# Patient Record
Sex: FEMALE | Race: BLACK OR AFRICAN AMERICAN | HISPANIC OR LATINO | Employment: OTHER | URBAN - METROPOLITAN AREA
[De-identification: names, ages, dates, MRNs, and addresses within clinical notes are randomized per-mention and may not be internally consistent; named-entity substitution may affect disease eponyms.]

---

## 2019-08-15 ENCOUNTER — HOSPITAL ENCOUNTER (EMERGENCY)
Age: 34
Discharge: HOME | End: 2019-08-15

## 2019-08-15 VITALS
DIASTOLIC BLOOD PRESSURE: 86 MMHG | WEIGHT: 181 LBS | BODY MASS INDEX: 30.16 KG/M2 | HEART RATE: 90 BPM | RESPIRATION RATE: 14 BRPM | SYSTOLIC BLOOD PRESSURE: 142 MMHG | HEIGHT: 65 IN | TEMPERATURE: 98.8 F | OXYGEN SATURATION: 99 %

## 2019-08-15 DIAGNOSIS — D57.00 SICKLE CELL ANEMIA WITH PAIN (CMD): Primary | ICD-10-CM

## 2019-08-15 PROCEDURE — 10002807 HB RX 258: Performed by: PHYSICIAN ASSISTANT

## 2019-08-15 PROCEDURE — 96361 HYDRATE IV INFUSION ADD-ON: CPT

## 2019-08-15 PROCEDURE — 10002800 HB RX 250 W HCPCS: Performed by: PHYSICIAN ASSISTANT

## 2019-08-15 PROCEDURE — 96375 TX/PRO/DX INJ NEW DRUG ADDON: CPT

## 2019-08-15 PROCEDURE — 99283 EMERGENCY DEPT VISIT LOW MDM: CPT

## 2019-08-15 PROCEDURE — 99284 EMERGENCY DEPT VISIT MOD MDM: CPT | Performed by: PHYSICIAN ASSISTANT

## 2019-08-15 PROCEDURE — 96374 THER/PROPH/DIAG INJ IV PUSH: CPT

## 2019-08-15 RX ORDER — HYDROCODONE BITARTRATE AND ACETAMINOPHEN 5; 325 MG/1; MG/1
1-2 TABLET ORAL EVERY 4 HOURS PRN
Qty: 12 TABLET | Refills: 0 | Status: SHIPPED | OUTPATIENT
Start: 2019-08-15

## 2019-08-15 RX ADMIN — HYDROMORPHONE HYDROCHLORIDE 1 MG: 1 INJECTION, SOLUTION INTRAMUSCULAR; INTRAVENOUS; SUBCUTANEOUS at 09:04

## 2019-08-15 RX ADMIN — SODIUM CHLORIDE 1000 ML: 9 INJECTION, SOLUTION INTRAVENOUS at 08:22

## 2019-08-15 RX ADMIN — MORPHINE SULFATE 4 MG: 4 INJECTION INTRAVENOUS at 08:24

## 2019-08-15 SDOH — HEALTH STABILITY: MENTAL HEALTH: HOW OFTEN DO YOU HAVE A DRINK CONTAINING ALCOHOL?: NEVER

## 2019-08-15 ASSESSMENT — PAIN SCALES - GENERAL
PAINLEVEL_OUTOF10: 8
PAINLEVEL_OUTOF10: 5
PAINLEVEL_OUTOF10: 5
PAINLEVEL_OUTOF10: 7
PAIN_LEVEL_AT_REST: 7

## 2019-08-18 ENCOUNTER — APPOINTMENT (OUTPATIENT)
Dept: CT IMAGING | Age: 34
DRG: 207 | End: 2019-08-18
Payer: COMMERCIAL

## 2019-08-18 ENCOUNTER — HOSPITAL ENCOUNTER (INPATIENT)
Age: 34
LOS: 9 days | Discharge: HOME | DRG: 207 | End: 2019-08-28
Attending: EMERGENCY MEDICINE | Admitting: INTERNAL MEDICINE
Payer: COMMERCIAL

## 2019-08-18 DIAGNOSIS — D57.00 SICKLE CELL CRISIS (HCC): Primary | ICD-10-CM

## 2019-08-18 PROBLEM — D57.40 SICKLE CELL BETA THALASSEMIA (HCC): Status: ACTIVE | Noted: 2019-08-18

## 2019-08-18 LAB
ANION GAP SERPL CALCULATED.3IONS-SCNC: 10 MMOL/L (ref 9–17)
BNP INTERPRETATION: ABNORMAL
BUN BLDV-MCNC: 11 MG/DL (ref 6–20)
BUN/CREAT BLD: ABNORMAL (ref 9–20)
CALCIUM SERPL-MCNC: 7.7 MG/DL (ref 8.6–10.4)
CHLORIDE BLD-SCNC: 106 MMOL/L (ref 98–107)
CO2: 23 MMOL/L (ref 20–31)
CREAT SERPL-MCNC: 0.53 MG/DL (ref 0.5–0.9)
GFR AFRICAN AMERICAN: >60 ML/MIN
GFR NON-AFRICAN AMERICAN: >60 ML/MIN
GFR SERPL CREATININE-BSD FRML MDRD: ABNORMAL ML/MIN/{1.73_M2}
GFR SERPL CREATININE-BSD FRML MDRD: ABNORMAL ML/MIN/{1.73_M2}
GLUCOSE BLD-MCNC: 116 MG/DL (ref 70–99)
HCG QUALITATIVE: NEGATIVE
LACTIC ACID, WHOLE BLOOD: 1.1 MMOL/L (ref 0.7–2.1)
LACTIC ACID: NORMAL MMOL/L
MAGNESIUM: 1.9 MG/DL (ref 1.6–2.6)
POTASSIUM SERPL-SCNC: 3.1 MMOL/L (ref 3.7–5.3)
PRO-BNP: 371 PG/ML
SODIUM BLD-SCNC: 139 MMOL/L (ref 135–144)

## 2019-08-18 PROCEDURE — 83605 ASSAY OF LACTIC ACID: CPT

## 2019-08-18 PROCEDURE — 6360000002 HC RX W HCPCS: Performed by: STUDENT IN AN ORGANIZED HEALTH CARE EDUCATION/TRAINING PROGRAM

## 2019-08-18 PROCEDURE — 86850 RBC ANTIBODY SCREEN: CPT

## 2019-08-18 PROCEDURE — 86880 COOMBS TEST DIRECT: CPT

## 2019-08-18 PROCEDURE — 80048 BASIC METABOLIC PNL TOTAL CA: CPT

## 2019-08-18 PROCEDURE — 83540 ASSAY OF IRON: CPT

## 2019-08-18 PROCEDURE — 82746 ASSAY OF FOLIC ACID SERUM: CPT

## 2019-08-18 PROCEDURE — 83735 ASSAY OF MAGNESIUM: CPT

## 2019-08-18 PROCEDURE — 6360000002 HC RX W HCPCS

## 2019-08-18 PROCEDURE — 6360000004 HC RX CONTRAST MEDICATION: Performed by: STUDENT IN AN ORGANIZED HEALTH CARE EDUCATION/TRAINING PROGRAM

## 2019-08-18 PROCEDURE — 85055 RETICULATED PLATELET ASSAY: CPT

## 2019-08-18 PROCEDURE — 82607 VITAMIN B-12: CPT

## 2019-08-18 PROCEDURE — 86901 BLOOD TYPING SEROLOGIC RH(D): CPT

## 2019-08-18 PROCEDURE — 86920 COMPATIBILITY TEST SPIN: CPT

## 2019-08-18 PROCEDURE — 83550 IRON BINDING TEST: CPT

## 2019-08-18 PROCEDURE — 84703 CHORIONIC GONADOTROPIN ASSAY: CPT

## 2019-08-18 PROCEDURE — 85025 COMPLETE CBC W/AUTO DIFF WBC: CPT

## 2019-08-18 PROCEDURE — 99285 EMERGENCY DEPT VISIT HI MDM: CPT

## 2019-08-18 PROCEDURE — 2500000003 HC RX 250 WO HCPCS

## 2019-08-18 PROCEDURE — 71260 CT THORAX DX C+: CPT

## 2019-08-18 PROCEDURE — 86900 BLOOD TYPING SEROLOGIC ABO: CPT

## 2019-08-18 PROCEDURE — 82728 ASSAY OF FERRITIN: CPT

## 2019-08-18 PROCEDURE — 2580000003 HC RX 258: Performed by: STUDENT IN AN ORGANIZED HEALTH CARE EDUCATION/TRAINING PROGRAM

## 2019-08-18 PROCEDURE — 86905 BLOOD TYPING RBC ANTIGENS: CPT

## 2019-08-18 PROCEDURE — 96365 THER/PROPH/DIAG IV INF INIT: CPT

## 2019-08-18 PROCEDURE — 83880 ASSAY OF NATRIURETIC PEPTIDE: CPT

## 2019-08-18 RX ORDER — 0.9 % SODIUM CHLORIDE 0.9 %
1000 INTRAVENOUS SOLUTION INTRAVENOUS ONCE
Status: COMPLETED | OUTPATIENT
Start: 2019-08-18 | End: 2019-08-19

## 2019-08-18 RX ADMIN — IOVERSOL 75 ML: 741 INJECTION INTRA-ARTERIAL; INTRAVENOUS at 23:46

## 2019-08-18 RX ADMIN — SODIUM CHLORIDE 1000 ML: 9 INJECTION, SOLUTION INTRAVENOUS at 23:30

## 2019-08-18 RX ADMIN — Medication 1250 MG: at 23:30

## 2019-08-19 ENCOUNTER — APPOINTMENT (OUTPATIENT)
Dept: GENERAL RADIOLOGY | Age: 34
DRG: 207 | End: 2019-08-19
Payer: COMMERCIAL

## 2019-08-19 PROBLEM — D56.1 BETA THALASSEMIA (HCC): Status: ACTIVE | Noted: 2019-08-18

## 2019-08-19 PROBLEM — D57.00 SICKLE CELL CRISIS (HCC): Status: ACTIVE | Noted: 2019-08-19

## 2019-08-19 LAB
-: NORMAL
ABO/RH: NORMAL
ABSOLUTE EOS #: 0 K/UL (ref 0–0.4)
ABSOLUTE EOS #: 0 K/UL (ref 0–0.4)
ABSOLUTE IMMATURE GRANULOCYTE: 0 K/UL (ref 0–0.3)
ABSOLUTE IMMATURE GRANULOCYTE: 0.24 K/UL (ref 0–0.3)
ABSOLUTE LYMPH #: 2.08 K/UL (ref 1–4.8)
ABSOLUTE LYMPH #: 3.72 K/UL (ref 1–4.8)
ABSOLUTE MONO #: 0.42 K/UL (ref 0.1–0.8)
ABSOLUTE MONO #: 0.48 K/UL (ref 0.1–0.8)
ABSOLUTE RETIC #: 0.06 M/UL (ref 0.03–0.08)
ACT TEG: 97 SEC (ref 86–118)
ACTION: NORMAL
ALBUMIN SERPL-MCNC: 3.4 G/DL (ref 3.5–5.2)
ALBUMIN/GLOBULIN RATIO: 1.2 (ref 1–2.5)
ALLEN TEST: ABNORMAL
ALLEN TEST: POSITIVE
ALLEN TEST: POSITIVE
ALP BLD-CCNC: 88 U/L (ref 35–104)
ALT SERPL-CCNC: 22 U/L (ref 5–33)
ANGLE, RAPID TEG: 74.3 DEG (ref 64–80)
ANION GAP SERPL CALCULATED.3IONS-SCNC: 17 MMOL/L (ref 9–17)
ANION GAP: 12 MMOL/L (ref 7–16)
AST SERPL-CCNC: 55 U/L
BASOPHILS # BLD: 0 % (ref 0–2)
BASOPHILS # BLD: 0 % (ref 0–2)
BASOPHILS ABSOLUTE: 0 K/UL (ref 0–0.2)
BASOPHILS ABSOLUTE: 0 K/UL (ref 0–0.2)
BILIRUB SERPL-MCNC: 2.15 MG/DL (ref 0.3–1.2)
BILIRUBIN DIRECT: 0.4 MG/DL
BILIRUBIN URINE: NEGATIVE
BILIRUBIN, INDIRECT: 1.75 MG/DL (ref 0–1)
BUN BLDV-MCNC: 10 MG/DL (ref 6–20)
BUN/CREAT BLD: ABNORMAL (ref 9–20)
CALCIUM SERPL-MCNC: 8 MG/DL (ref 8.6–10.4)
CHLORIDE BLD-SCNC: 111 MMOL/L (ref 98–107)
CO2: 20 MMOL/L (ref 20–31)
COLOR: YELLOW
COMMENT UA: NORMAL
CREAT SERPL-MCNC: 0.55 MG/DL (ref 0.5–0.9)
CULTURE: NORMAL
DAT, POLYSPECIFIC: NEGATIVE
DATE AND TIME: NORMAL
DIFFERENTIAL TYPE: ABNORMAL
DIFFERENTIAL TYPE: ABNORMAL
DIRECT EXAM: NORMAL
EOSINOPHILS RELATIVE PERCENT: 0 % (ref 1–4)
EOSINOPHILS RELATIVE PERCENT: 0 % (ref 1–4)
EPL-TEG: 0 % (ref 0–15)
FERRITIN: 6897 UG/L (ref 13–150)
FIBRINOGEN: 438 MG/DL (ref 140–420)
FIO2: 40
FIO2: 40
FIO2: 70
FOLATE: 15.4 NG/ML
GFR AFRICAN AMERICAN: >60 ML/MIN
GFR NON-AFRICAN AMERICAN: >60 ML/MIN
GFR NON-AFRICAN AMERICAN: >60 ML/MIN
GFR SERPL CREATININE-BSD FRML MDRD: >60 ML/MIN
GFR SERPL CREATININE-BSD FRML MDRD: ABNORMAL ML/MIN/{1.73_M2}
GFR SERPL CREATININE-BSD FRML MDRD: ABNORMAL ML/MIN/{1.73_M2}
GFR SERPL CREATININE-BSD FRML MDRD: NORMAL ML/MIN/{1.73_M2}
GLOBULIN: ABNORMAL G/DL (ref 1.5–3.8)
GLUCOSE BLD-MCNC: 118 MG/DL (ref 70–99)
GLUCOSE BLD-MCNC: 124 MG/DL (ref 74–100)
GLUCOSE URINE: NEGATIVE
HAPTOGLOBIN: 94 MG/DL (ref 30–200)
HCG(URINE) PREGNANCY TEST: NEGATIVE
HCT VFR BLD CALC: 16.6 % (ref 36.3–47.1)
HCT VFR BLD CALC: 18.1 % (ref 36.3–47.1)
HCT VFR BLD CALC: 21 % (ref 36.3–47.1)
HCT VFR BLD CALC: 21.3 % (ref 36.3–47.1)
HCT VFR BLD CALC: 23.4 % (ref 36.3–47.1)
HCT VFR BLD CALC: 24 % (ref 36.3–47.1)
HEMOGLOBIN: 5.5 G/DL (ref 11.9–15.1)
HEMOGLOBIN: 6.2 G/DL (ref 11.9–15.1)
HEMOGLOBIN: 7.1 G/DL (ref 11.9–15.1)
HEMOGLOBIN: 7.2 G/DL (ref 11.9–15.1)
HEMOGLOBIN: 7.7 G/DL (ref 11.9–15.1)
HEMOGLOBIN: 8 G/DL (ref 11.9–15.1)
HEPARIN THERAPY: NORMAL
HIV AG/AB: NONREACTIVE
IMMATURE GRANULOCYTES: 0 %
IMMATURE GRANULOCYTES: 2 %
IMMATURE RETIC FRACT: 31.7 % (ref 2.7–18.3)
INR BLD: 1
IRON SATURATION: 58 % (ref 20–55)
IRON: 106 UG/DL (ref 37–145)
KETONES, URINE: NEGATIVE
KINETICS RAPID TEG: 1.4 MIN (ref 1–2)
LACTATE DEHYDROGENASE: 1037 U/L (ref 135–214)
LACTIC ACID, WHOLE BLOOD: 1.4 MMOL/L (ref 0.7–2.1)
LEUKOCYTE ESTERASE, URINE: NEGATIVE
LY30 (LYSIS) TEG: 0 % (ref 0–8)
LYMPHOCYTES # BLD: 25 % (ref 24–44)
LYMPHOCYTES # BLD: 31 % (ref 24–44)
Lab: NORMAL
MA(MAX CLOT) RAPID TEG: 62.2 MM (ref 52–71)
MCH RBC QN AUTO: 24 PG (ref 25.2–33.5)
MCH RBC QN AUTO: 24.1 PG (ref 25.2–33.5)
MCHC RBC AUTO-ENTMCNC: 33.1 G/DL (ref 28.4–34.8)
MCHC RBC AUTO-ENTMCNC: 33.8 G/DL (ref 28.4–34.8)
MCV RBC AUTO: 71 FL (ref 82.6–102.9)
MCV RBC AUTO: 72.8 FL (ref 82.6–102.9)
MODE: ABNORMAL
MONOCYTES # BLD: 4 % (ref 1–7)
MONOCYTES # BLD: 5 % (ref 1–7)
MORPHOLOGY: ABNORMAL
MRSA, DNA, NASAL: NORMAL
MYCOPLASMA PNEUMONIAE IGM: 1.31
NEGATIVE BASE EXCESS, ART: ABNORMAL (ref 0–2)
NITRITE, URINE: NEGATIVE
NOTIFY: NORMAL
NRBC AUTOMATED: 27 PER 100 WBC
NRBC AUTOMATED: 33.7 PER 100 WBC
NUCLEATED RED BLOOD CELLS: 27 PER 100 WBC
NUCLEATED RED BLOOD CELLS: 48 PER 100 WBC
O2 DEVICE/FLOW/%: ABNORMAL
PARTIAL THROMBOPLASTIN TIME: 22 SEC (ref 20.5–30.5)
PATHOLOGIST: NORMAL
PATIENT TEMP: 38.5
PATIENT TEMP: 38.9
PATIENT TEMP: ABNORMAL
PDW BLD-RTO: 15.4 % (ref 11.8–14.4)
PDW BLD-RTO: 17.7 % (ref 11.8–14.4)
PH UA: 5.5 (ref 5–8)
PLATELET # BLD: 89 K/UL (ref 138–453)
PLATELET # BLD: ABNORMAL K/UL (ref 138–453)
PLATELET # BLD: ABNORMAL K/UL (ref 138–453)
PLATELET ESTIMATE: ABNORMAL
PLATELET ESTIMATE: ABNORMAL
PLATELET, FLUORESCENCE: 36 K/UL (ref 138–453)
PLATELET, FLUORESCENCE: 36 K/UL (ref 138–453)
PLATELET, IMMATURE FRACTION: 11.1 % (ref 1.1–10.3)
PLATELET, IMMATURE FRACTION: 16.9 % (ref 1.1–10.3)
PMV BLD AUTO: ABNORMAL FL (ref 8.1–13.5)
PMV BLD AUTO: ABNORMAL FL (ref 8.1–13.5)
POC CHLORIDE: 110 MMOL/L (ref 98–107)
POC CREATININE: 0.68 MG/DL (ref 0.51–1.19)
POC HCO3: 25.5 MMOL/L (ref 21–28)
POC HCO3: 27 MMOL/L (ref 21–28)
POC HCO3: 27.1 MMOL/L (ref 21–28)
POC HEMATOCRIT: 17 % (ref 36–46)
POC HEMOGLOBIN: 5.6 G/DL (ref 12–16)
POC IONIZED CALCIUM: 1.18 MMOL/L (ref 1.15–1.33)
POC LACTIC ACID: 0.37 MMOL/L (ref 0.56–1.39)
POC LACTIC ACID: 0.59 MMOL/L (ref 0.56–1.39)
POC O2 SATURATION: 100 % (ref 94–98)
POC O2 SATURATION: 99 % (ref 94–98)
POC O2 SATURATION: 99 % (ref 94–98)
POC PCO2 TEMP: 40 MM HG
POC PCO2 TEMP: 41 MM HG
POC PCO2 TEMP: ABNORMAL MM HG
POC PCO2: 36.9 MM HG (ref 35–48)
POC PCO2: 38.1 MM HG (ref 35–48)
POC PCO2: 39.3 MM HG (ref 35–48)
POC PH TEMP: 7.41
POC PH TEMP: 7.45
POC PH TEMP: ABNORMAL
POC PH: 7.43 (ref 7.35–7.45)
POC PH: 7.45 (ref 7.35–7.45)
POC PH: 7.47 (ref 7.35–7.45)
POC PO2 TEMP: 123 MM HG
POC PO2 TEMP: 141 MM HG
POC PO2 TEMP: ABNORMAL MM HG
POC PO2: 113.4 MM HG (ref 83–108)
POC PO2: 128.8 MM HG (ref 83–108)
POC PO2: 248 MM HG (ref 83–108)
POC POTASSIUM: 3.8 MMOL/L (ref 3.5–4.5)
POC SODIUM: 147 MMOL/L (ref 138–146)
POSITIVE BASE EXCESS, ART: 1 (ref 0–3)
POSITIVE BASE EXCESS, ART: 3 (ref 0–3)
POSITIVE BASE EXCESS, ART: 3 (ref 0–3)
POTASSIUM SERPL-SCNC: 3.6 MMOL/L (ref 3.7–5.3)
PROTEIN UA: NEGATIVE
PROTHROMBIN TIME: 10.6 SEC (ref 9–12)
RBC # BLD: 2.28 M/UL (ref 3.95–5.11)
RBC # BLD: 3 M/UL (ref 3.95–5.11)
RBC # BLD: ABNORMAL 10*6/UL
RBC # BLD: ABNORMAL 10*6/UL
REACTION TIME RAPID TEG: 0.5 MIN (ref 0–1)
READ BACK: YES
REASON FOR REJECTION: NORMAL
RETIC %: 2 % (ref 0.5–1.9)
RETIC HEMOGLOBIN: 21.7 PG (ref 28.2–35.7)
SAMPLE SITE: ABNORMAL
SEG NEUTROPHILS: 63 % (ref 36–66)
SEG NEUTROPHILS: 70 % (ref 36–66)
SEGMENTED NEUTROPHILS ABSOLUTE COUNT: 5.8 K/UL (ref 1.8–7.7)
SEGMENTED NEUTROPHILS ABSOLUTE COUNT: 7.56 K/UL (ref 1.8–7.7)
SEROLOGICAL REPORT: NORMAL
SODIUM BLD-SCNC: 148 MMOL/L (ref 135–144)
SPECIFIC GRAVITY UA: 1.01 (ref 1–1.03)
SPECIMEN DESCRIPTION: NORMAL
TCO2 (CALC), ART: 27 MMOL/L (ref 22–29)
TCO2 (CALC), ART: 28 MMOL/L (ref 22–29)
TCO2 (CALC), ART: 28 MMOL/L (ref 22–29)
TEG COMMENT: NORMAL
TOTAL IRON BINDING CAPACITY: 183 UG/DL (ref 250–450)
TOTAL PROTEIN: 6.2 G/DL (ref 6.4–8.3)
TRANSFUSION REACTION REPORT: NORMAL
TROPONIN INTERP: ABNORMAL
TROPONIN INTERP: ABNORMAL
TROPONIN T: ABNORMAL NG/ML
TROPONIN T: ABNORMAL NG/ML
TROPONIN, HIGH SENSITIVITY: 17 NG/L (ref 0–14)
TROPONIN, HIGH SENSITIVITY: 20 NG/L (ref 0–14)
TSH SERPL DL<=0.05 MIU/L-ACNC: 1.78 MIU/L (ref 0.3–5)
TURBIDITY: CLEAR
UNSATURATED IRON BINDING CAPACITY: 77 UG/DL (ref 112–347)
URINE HGB: NEGATIVE
UROBILINOGEN, URINE: NORMAL
VITAMIN B-12: 412 PG/ML (ref 232–1245)
WBC # BLD: 12 K/UL (ref 3.5–11.3)
WBC # BLD: 8.3 K/UL (ref 3.5–11.3)
WBC # BLD: ABNORMAL 10*3/UL
WBC # BLD: ABNORMAL 10*3/UL
ZZ NTE CLEAN UP: ORDERED TEST: NORMAL
ZZ NTE WITH NAME CLEAN UP: SPECIMEN SOURCE: NORMAL

## 2019-08-19 PROCEDURE — 31624 DX BRONCHOSCOPE/LAVAGE: CPT | Performed by: INTERNAL MEDICINE

## 2019-08-19 PROCEDURE — 94762 N-INVAS EAR/PLS OXIMTRY CONT: CPT

## 2019-08-19 PROCEDURE — 99291 CRITICAL CARE FIRST HOUR: CPT | Performed by: INTERNAL MEDICINE

## 2019-08-19 PROCEDURE — 80076 HEPATIC FUNCTION PANEL: CPT

## 2019-08-19 PROCEDURE — 86157 COLD AGGLUTININ TITER: CPT

## 2019-08-19 PROCEDURE — 6360000002 HC RX W HCPCS: Performed by: STUDENT IN AN ORGANIZED HEALTH CARE EDUCATION/TRAINING PROGRAM

## 2019-08-19 PROCEDURE — 87070 CULTURE OTHR SPECIMN AEROBIC: CPT

## 2019-08-19 PROCEDURE — 86235 NUCLEAR ANTIGEN ANTIBODY: CPT

## 2019-08-19 PROCEDURE — 87015 SPECIMEN INFECT AGNT CONCNTJ: CPT

## 2019-08-19 PROCEDURE — 86225 DNA ANTIBODY NATIVE: CPT

## 2019-08-19 PROCEDURE — 0B9F8ZX DRAINAGE OF RIGHT LOWER LUNG LOBE, VIA NATURAL OR ARTIFICIAL OPENING ENDOSCOPIC, DIAGNOSTIC: ICD-10-PCS | Performed by: INTERNAL MEDICINE

## 2019-08-19 PROCEDURE — 87641 MR-STAPH DNA AMP PROBE: CPT

## 2019-08-19 PROCEDURE — 87529 HSV DNA AMP PROBE: CPT

## 2019-08-19 PROCEDURE — 2000000000 HC ICU R&B

## 2019-08-19 PROCEDURE — 2700000000 HC OXYGEN THERAPY PER DAY

## 2019-08-19 PROCEDURE — 82803 BLOOD GASES ANY COMBINATION: CPT

## 2019-08-19 PROCEDURE — P9016 RBC LEUKOCYTES REDUCED: HCPCS

## 2019-08-19 PROCEDURE — 83516 IMMUNOASSAY NONANTIBODY: CPT

## 2019-08-19 PROCEDURE — 85610 PROTHROMBIN TIME: CPT

## 2019-08-19 PROCEDURE — 85730 THROMBOPLASTIN TIME PARTIAL: CPT

## 2019-08-19 PROCEDURE — 6370000000 HC RX 637 (ALT 250 FOR IP): Performed by: INTERNAL MEDICINE

## 2019-08-19 PROCEDURE — P9037 PLATE PHERES LEUKOREDU IRRAD: HCPCS

## 2019-08-19 PROCEDURE — 99292 CRITICAL CARE ADDL 30 MIN: CPT | Performed by: INTERNAL MEDICINE

## 2019-08-19 PROCEDURE — 80048 BASIC METABOLIC PNL TOTAL CA: CPT

## 2019-08-19 PROCEDURE — 96367 TX/PROPH/DG ADDL SEQ IV INF: CPT

## 2019-08-19 PROCEDURE — 31500 INSERT EMERGENCY AIRWAY: CPT | Performed by: INTERNAL MEDICINE

## 2019-08-19 PROCEDURE — 86078 PHYS BLOOD BANK SERV REACTJ: CPT

## 2019-08-19 PROCEDURE — 2580000003 HC RX 258: Performed by: STUDENT IN AN ORGANIZED HEALTH CARE EDUCATION/TRAINING PROGRAM

## 2019-08-19 PROCEDURE — 85210 CLOT FACTOR II PROTHROM SPEC: CPT

## 2019-08-19 PROCEDURE — 87798 DETECT AGENT NOS DNA AMP: CPT

## 2019-08-19 PROCEDURE — 81025 URINE PREGNANCY TEST: CPT

## 2019-08-19 PROCEDURE — 87255 GENET VIRUS ISOLATE HSV: CPT

## 2019-08-19 PROCEDURE — 87206 SMEAR FLUORESCENT/ACID STAI: CPT

## 2019-08-19 PROCEDURE — 94761 N-INVAS EAR/PLS OXIMETRY MLT: CPT

## 2019-08-19 PROCEDURE — 0B9D8ZX DRAINAGE OF RIGHT MIDDLE LUNG LOBE, VIA NATURAL OR ARTIFICIAL OPENING ENDOSCOPIC, DIAGNOSTIC: ICD-10-PCS | Performed by: INTERNAL MEDICINE

## 2019-08-19 PROCEDURE — 86038 ANTINUCLEAR ANTIBODIES: CPT

## 2019-08-19 PROCEDURE — 82565 ASSAY OF CREATININE: CPT

## 2019-08-19 PROCEDURE — 96366 THER/PROPH/DIAG IV INF ADDON: CPT

## 2019-08-19 PROCEDURE — 85045 AUTOMATED RETICULOCYTE COUNT: CPT

## 2019-08-19 PROCEDURE — 6360000002 HC RX W HCPCS

## 2019-08-19 PROCEDURE — 86900 BLOOD TYPING SEROLOGIC ABO: CPT

## 2019-08-19 PROCEDURE — 82435 ASSAY OF BLOOD CHLORIDE: CPT

## 2019-08-19 PROCEDURE — 87389 HIV-1 AG W/HIV-1&-2 AB AG IA: CPT

## 2019-08-19 PROCEDURE — 87102 FUNGUS ISOLATION CULTURE: CPT

## 2019-08-19 PROCEDURE — 36415 COLL VENOUS BLD VENIPUNCTURE: CPT

## 2019-08-19 PROCEDURE — 94660 CPAP INITIATION&MGMT: CPT

## 2019-08-19 PROCEDURE — 86880 COOMBS TEST DIRECT: CPT

## 2019-08-19 PROCEDURE — 36430 TRANSFUSION BLD/BLD COMPNT: CPT

## 2019-08-19 PROCEDURE — 82664 ELECTROPHORETIC TEST: CPT

## 2019-08-19 PROCEDURE — 2500000003 HC RX 250 WO HCPCS: Performed by: INTERNAL MEDICINE

## 2019-08-19 PROCEDURE — 87449 NOS EACH ORGANISM AG IA: CPT

## 2019-08-19 PROCEDURE — 51702 INSERT TEMP BLADDER CATH: CPT

## 2019-08-19 PROCEDURE — 71045 X-RAY EXAM CHEST 1 VIEW: CPT

## 2019-08-19 PROCEDURE — 85362 FIBRIN DEGRADATION PRODUCTS: CPT

## 2019-08-19 PROCEDURE — 83605 ASSAY OF LACTIC ACID: CPT

## 2019-08-19 PROCEDURE — 6370000000 HC RX 637 (ALT 250 FOR IP): Performed by: STUDENT IN AN ORGANIZED HEALTH CARE EDUCATION/TRAINING PROGRAM

## 2019-08-19 PROCEDURE — 83615 LACTATE (LD) (LDH) ENZYME: CPT

## 2019-08-19 PROCEDURE — 86747 PARVOVIRUS ANTIBODY: CPT

## 2019-08-19 PROCEDURE — 88112 CYTOPATH CELL ENHANCE TECH: CPT

## 2019-08-19 PROCEDURE — 94002 VENT MGMT INPAT INIT DAY: CPT

## 2019-08-19 PROCEDURE — 85049 AUTOMATED PLATELET COUNT: CPT

## 2019-08-19 PROCEDURE — 2580000003 HC RX 258

## 2019-08-19 PROCEDURE — 2500000003 HC RX 250 WO HCPCS: Performed by: STUDENT IN AN ORGANIZED HEALTH CARE EDUCATION/TRAINING PROGRAM

## 2019-08-19 PROCEDURE — 83010 ASSAY OF HAPTOGLOBIN QUANT: CPT

## 2019-08-19 PROCEDURE — 87300 AG DETECTION POLYVAL IF: CPT

## 2019-08-19 PROCEDURE — 93005 ELECTROCARDIOGRAM TRACING: CPT | Performed by: EMERGENCY MEDICINE

## 2019-08-19 PROCEDURE — 87040 BLOOD CULTURE FOR BACTERIA: CPT

## 2019-08-19 PROCEDURE — 3609010800 HC BRONCHOSCOPY ALVEOLAR LAVAGE: Performed by: INTERNAL MEDICINE

## 2019-08-19 PROCEDURE — 85014 HEMATOCRIT: CPT

## 2019-08-19 PROCEDURE — 87140 CULTURE TYPE IMMUNOFLUORESC: CPT

## 2019-08-19 PROCEDURE — 82330 ASSAY OF CALCIUM: CPT

## 2019-08-19 PROCEDURE — 87205 SMEAR GRAM STAIN: CPT

## 2019-08-19 PROCEDURE — 87116 MYCOBACTERIA CULTURE: CPT

## 2019-08-19 PROCEDURE — 86902 BLOOD TYPE ANTIGEN DONOR EA: CPT

## 2019-08-19 PROCEDURE — 84443 ASSAY THYROID STIM HORMONE: CPT

## 2019-08-19 PROCEDURE — 85384 FIBRINOGEN ACTIVITY: CPT

## 2019-08-19 PROCEDURE — 36620 INSERTION CATHETER ARTERY: CPT

## 2019-08-19 PROCEDURE — 87899 AGENT NOS ASSAY W/OPTIC: CPT

## 2019-08-19 PROCEDURE — 85025 COMPLETE CBC W/AUTO DIFF WBC: CPT

## 2019-08-19 PROCEDURE — 84295 ASSAY OF SERUM SODIUM: CPT

## 2019-08-19 PROCEDURE — 84484 ASSAY OF TROPONIN QUANT: CPT

## 2019-08-19 PROCEDURE — 88305 TISSUE EXAM BY PATHOLOGIST: CPT

## 2019-08-19 PROCEDURE — 85055 RETICULATED PLATELET ASSAY: CPT

## 2019-08-19 PROCEDURE — 2500000003 HC RX 250 WO HCPCS

## 2019-08-19 PROCEDURE — 85390 FIBRINOLYSINS SCREEN I&R: CPT

## 2019-08-19 PROCEDURE — 94640 AIRWAY INHALATION TREATMENT: CPT

## 2019-08-19 PROCEDURE — 87252 VIRUS INOCULATION TISSUE: CPT

## 2019-08-19 PROCEDURE — 99255 IP/OBS CONSLTJ NEW/EST HI 80: CPT | Performed by: INTERNAL MEDICINE

## 2019-08-19 PROCEDURE — 86738 MYCOPLASMA ANTIBODY: CPT

## 2019-08-19 PROCEDURE — 36600 WITHDRAWAL OF ARTERIAL BLOOD: CPT

## 2019-08-19 PROCEDURE — 5A1955Z RESPIRATORY VENTILATION, GREATER THAN 96 CONSECUTIVE HOURS: ICD-10-PCS | Performed by: INTERNAL MEDICINE

## 2019-08-19 PROCEDURE — 2709999900 HC NON-CHARGEABLE SUPPLY: Performed by: INTERNAL MEDICINE

## 2019-08-19 PROCEDURE — 83020 HEMOGLOBIN ELECTROPHORESIS: CPT

## 2019-08-19 PROCEDURE — 81003 URINALYSIS AUTO W/O SCOPE: CPT

## 2019-08-19 PROCEDURE — 84132 ASSAY OF SERUM POTASSIUM: CPT

## 2019-08-19 PROCEDURE — 94770 HC ETCO2 MONITOR DAILY: CPT

## 2019-08-19 PROCEDURE — 0BH17EZ INSERTION OF ENDOTRACHEAL AIRWAY INTO TRACHEA, VIA NATURAL OR ARTIFICIAL OPENING: ICD-10-PCS | Performed by: INTERNAL MEDICINE

## 2019-08-19 PROCEDURE — 88312 SPECIAL STAINS GROUP 1: CPT

## 2019-08-19 PROCEDURE — 88313 SPECIAL STAINS GROUP 2: CPT

## 2019-08-19 PROCEDURE — 82947 ASSAY GLUCOSE BLOOD QUANT: CPT

## 2019-08-19 PROCEDURE — 85018 HEMOGLOBIN: CPT

## 2019-08-19 PROCEDURE — P9040 RBC LEUKOREDUCED IRRADIATED: HCPCS

## 2019-08-19 PROCEDURE — 96375 TX/PRO/DX INJ NEW DRUG ADDON: CPT

## 2019-08-19 RX ORDER — ACETAMINOPHEN 650 MG/1
650 SUPPOSITORY RECTAL EVERY 4 HOURS PRN
Status: DISCONTINUED | OUTPATIENT
Start: 2019-08-19 | End: 2019-08-19

## 2019-08-19 RX ORDER — VECURONIUM BROMIDE 1 MG/ML
INJECTION, POWDER, LYOPHILIZED, FOR SOLUTION INTRAVENOUS
Status: COMPLETED
Start: 2019-08-19 | End: 2019-08-19

## 2019-08-19 RX ORDER — ACETAMINOPHEN 650 MG/1
650 SUPPOSITORY RECTAL EVERY 4 HOURS PRN
Status: DISCONTINUED | OUTPATIENT
Start: 2019-08-19 | End: 2019-08-28 | Stop reason: HOSPADM

## 2019-08-19 RX ORDER — 0.9 % SODIUM CHLORIDE 0.9 %
1000 INTRAVENOUS SOLUTION INTRAVENOUS ONCE
Status: COMPLETED | OUTPATIENT
Start: 2019-08-19 | End: 2019-08-19

## 2019-08-19 RX ORDER — LIDOCAINE HYDROCHLORIDE 10 MG/ML
INJECTION, SOLUTION EPIDURAL; INFILTRATION; INTRACAUDAL; PERINEURAL PRN
Status: DISCONTINUED | OUTPATIENT
Start: 2019-08-19 | End: 2019-08-19 | Stop reason: ALTCHOICE

## 2019-08-19 RX ORDER — ONDANSETRON 2 MG/ML
4 INJECTION INTRAMUSCULAR; INTRAVENOUS EVERY 6 HOURS PRN
Status: DISCONTINUED | OUTPATIENT
Start: 2019-08-19 | End: 2019-08-28 | Stop reason: HOSPADM

## 2019-08-19 RX ORDER — 0.9 % SODIUM CHLORIDE 0.9 %
250 INTRAVENOUS SOLUTION INTRAVENOUS ONCE
Status: COMPLETED | OUTPATIENT
Start: 2019-08-19 | End: 2019-08-19

## 2019-08-19 RX ORDER — 0.9 % SODIUM CHLORIDE 0.9 %
250 INTRAVENOUS SOLUTION INTRAVENOUS ONCE
Status: DISCONTINUED | OUTPATIENT
Start: 2019-08-19 | End: 2019-08-22

## 2019-08-19 RX ORDER — PROPOFOL 10 MG/ML
10 INJECTION, EMULSION INTRAVENOUS
Status: DISCONTINUED | OUTPATIENT
Start: 2019-08-19 | End: 2019-08-24

## 2019-08-19 RX ORDER — CALCIUM GLUCONATE 94 MG/ML
1 INJECTION, SOLUTION INTRAVENOUS ONCE
Status: COMPLETED | OUTPATIENT
Start: 2019-08-19 | End: 2019-08-19

## 2019-08-19 RX ORDER — AMLODIPINE BESYLATE 5 MG/1
5 TABLET ORAL DAILY
Status: DISCONTINUED | OUTPATIENT
Start: 2019-08-19 | End: 2019-08-19

## 2019-08-19 RX ORDER — SODIUM CHLORIDE 0.9 % (FLUSH) 0.9 %
10 SYRINGE (ML) INJECTION PRN
Status: DISCONTINUED | OUTPATIENT
Start: 2019-08-19 | End: 2019-08-28 | Stop reason: HOSPADM

## 2019-08-19 RX ORDER — MAGNESIUM SULFATE 1 G/100ML
1 INJECTION INTRAVENOUS PRN
Status: DISCONTINUED | OUTPATIENT
Start: 2019-08-19 | End: 2019-08-28 | Stop reason: HOSPADM

## 2019-08-19 RX ORDER — FENTANYL CITRATE 50 UG/ML
25 INJECTION, SOLUTION INTRAMUSCULAR; INTRAVENOUS ONCE
Status: DISCONTINUED | OUTPATIENT
Start: 2019-08-19 | End: 2019-08-19

## 2019-08-19 RX ORDER — FENTANYL CITRATE 50 UG/ML
25 INJECTION, SOLUTION INTRAMUSCULAR; INTRAVENOUS
Status: DISCONTINUED | OUTPATIENT
Start: 2019-08-19 | End: 2019-08-20

## 2019-08-19 RX ORDER — LEVOFLOXACIN 5 MG/ML
750 INJECTION, SOLUTION INTRAVENOUS EVERY 24 HOURS
Status: DISCONTINUED | OUTPATIENT
Start: 2019-08-19 | End: 2019-08-26

## 2019-08-19 RX ORDER — HYDROCODONE BITARTRATE AND ACETAMINOPHEN 5; 300 MG/1; MG/1
1 TABLET ORAL EVERY 6 HOURS PRN
COMMUNITY

## 2019-08-19 RX ORDER — SODIUM CHLORIDE 9 MG/ML
INJECTION, SOLUTION INTRAVENOUS CONTINUOUS
Status: DISCONTINUED | OUTPATIENT
Start: 2019-08-19 | End: 2019-08-19

## 2019-08-19 RX ORDER — SODIUM CHLORIDE 0.9 % (FLUSH) 0.9 %
10 SYRINGE (ML) INJECTION EVERY 12 HOURS SCHEDULED
Status: DISCONTINUED | OUTPATIENT
Start: 2019-08-19 | End: 2019-08-28 | Stop reason: HOSPADM

## 2019-08-19 RX ORDER — POTASSIUM CHLORIDE 7.45 MG/ML
10 INJECTION INTRAVENOUS
Status: DISPENSED | OUTPATIENT
Start: 2019-08-19 | End: 2019-08-19

## 2019-08-19 RX ORDER — FENTANYL CITRATE 50 UG/ML
50 INJECTION, SOLUTION INTRAMUSCULAR; INTRAVENOUS ONCE
Status: COMPLETED | OUTPATIENT
Start: 2019-08-19 | End: 2019-08-19

## 2019-08-19 RX ORDER — PROPOFOL 10 MG/ML
20 INJECTION, EMULSION INTRAVENOUS PRN
Status: DISCONTINUED | OUTPATIENT
Start: 2019-08-19 | End: 2019-08-25

## 2019-08-19 RX ORDER — ACETAMINOPHEN 325 MG/1
650 TABLET ORAL EVERY 4 HOURS PRN
Status: DISCONTINUED | OUTPATIENT
Start: 2019-08-19 | End: 2019-08-28 | Stop reason: HOSPADM

## 2019-08-19 RX ORDER — FENTANYL CITRATE 50 UG/ML
50 INJECTION, SOLUTION INTRAMUSCULAR; INTRAVENOUS
Status: DISCONTINUED | OUTPATIENT
Start: 2019-08-19 | End: 2019-08-19

## 2019-08-19 RX ORDER — SODIUM CHLORIDE, SODIUM LACTATE, POTASSIUM CHLORIDE, AND CALCIUM CHLORIDE .6; .31; .03; .02 G/100ML; G/100ML; G/100ML; G/100ML
500 INJECTION, SOLUTION INTRAVENOUS ONCE
Status: COMPLETED | OUTPATIENT
Start: 2019-08-19 | End: 2019-08-19

## 2019-08-19 RX ORDER — METOPROLOL TARTRATE 5 MG/5ML
5 INJECTION INTRAVENOUS EVERY 6 HOURS PRN
Status: DISCONTINUED | OUTPATIENT
Start: 2019-08-19 | End: 2019-08-23

## 2019-08-19 RX ORDER — VECURONIUM BROMIDE 1 MG/ML
10 INJECTION, POWDER, LYOPHILIZED, FOR SOLUTION INTRAVENOUS ONCE
Status: COMPLETED | OUTPATIENT
Start: 2019-08-19 | End: 2019-08-19

## 2019-08-19 RX ORDER — POTASSIUM CHLORIDE 7.45 MG/ML
10 INJECTION INTRAVENOUS PRN
Status: DISCONTINUED | OUTPATIENT
Start: 2019-08-19 | End: 2019-08-27

## 2019-08-19 RX ORDER — IPRATROPIUM BROMIDE AND ALBUTEROL SULFATE 2.5; .5 MG/3ML; MG/3ML
1 SOLUTION RESPIRATORY (INHALATION) EVERY 4 HOURS PRN
Status: DISCONTINUED | OUTPATIENT
Start: 2019-08-19 | End: 2019-08-21

## 2019-08-19 RX ORDER — SODIUM CHLORIDE 450 MG/100ML
INJECTION, SOLUTION INTRAVENOUS CONTINUOUS
Status: DISCONTINUED | OUTPATIENT
Start: 2019-08-19 | End: 2019-08-26

## 2019-08-19 RX ORDER — LEVOFLOXACIN 5 MG/ML
750 INJECTION, SOLUTION INTRAVENOUS EVERY 24 HOURS
Status: DISCONTINUED | OUTPATIENT
Start: 2019-08-19 | End: 2019-08-19

## 2019-08-19 RX ORDER — PROPOFOL 10 MG/ML
INJECTION, EMULSION INTRAVENOUS
Status: COMPLETED
Start: 2019-08-19 | End: 2019-08-19

## 2019-08-19 RX ORDER — LORAZEPAM 2 MG/ML
1 INJECTION INTRAMUSCULAR ONCE
Status: COMPLETED | OUTPATIENT
Start: 2019-08-19 | End: 2019-08-19

## 2019-08-19 RX ADMIN — WATER 10 ML: 1 INJECTION INTRAMUSCULAR; INTRAVENOUS; SUBCUTANEOUS at 13:22

## 2019-08-19 RX ADMIN — SODIUM CHLORIDE 1000 ML: 9 INJECTION, SOLUTION INTRAVENOUS at 13:10

## 2019-08-19 RX ADMIN — PROPOFOL 50 MCG/KG/MIN: 10 INJECTION, EMULSION INTRAVENOUS at 15:26

## 2019-08-19 RX ADMIN — POTASSIUM CHLORIDE 10 MEQ: 7.46 INJECTION, SOLUTION INTRAVENOUS at 06:35

## 2019-08-19 RX ADMIN — CALCIUM GLUCONATE 1 G: 98 INJECTION, SOLUTION INTRAVENOUS at 01:28

## 2019-08-19 RX ADMIN — PIPERACILLIN SODIUM AND TAZOBACTAM SODIUM 4.5 G: 4; .5 INJECTION, POWDER, LYOPHILIZED, FOR SOLUTION INTRAVENOUS at 01:03

## 2019-08-19 RX ADMIN — PROPOFOL 50 MCG/KG/MIN: 10 INJECTION, EMULSION INTRAVENOUS at 18:37

## 2019-08-19 RX ADMIN — POTASSIUM CHLORIDE 10 MEQ: 7.46 INJECTION, SOLUTION INTRAVENOUS at 05:14

## 2019-08-19 RX ADMIN — PROPOFOL 50 MCG/KG/MIN: 10 INJECTION, EMULSION INTRAVENOUS at 22:56

## 2019-08-19 RX ADMIN — SODIUM CHLORIDE: 4.5 INJECTION, SOLUTION INTRAVENOUS at 11:28

## 2019-08-19 RX ADMIN — Medication 50 MCG/HR: at 16:16

## 2019-08-19 RX ADMIN — FENTANYL CITRATE 50 MCG: 50 INJECTION INTRAMUSCULAR; INTRAVENOUS at 16:56

## 2019-08-19 RX ADMIN — LORAZEPAM 1 MG: 2 INJECTION INTRAMUSCULAR; INTRAVENOUS at 01:55

## 2019-08-19 RX ADMIN — FENTANYL CITRATE 50 MCG: 50 INJECTION, SOLUTION INTRAMUSCULAR; INTRAVENOUS at 13:22

## 2019-08-19 RX ADMIN — SODIUM CHLORIDE, PRESERVATIVE FREE 10 ML: 5 INJECTION INTRAVENOUS at 20:30

## 2019-08-19 RX ADMIN — SODIUM CHLORIDE: 9 INJECTION, SOLUTION INTRAVENOUS at 03:00

## 2019-08-19 RX ADMIN — POTASSIUM CHLORIDE 10 MEQ: 7.46 INJECTION, SOLUTION INTRAVENOUS at 01:44

## 2019-08-19 RX ADMIN — IPRATROPIUM BROMIDE AND ALBUTEROL SULFATE 1 AMPULE: .5; 3 SOLUTION RESPIRATORY (INHALATION) at 12:30

## 2019-08-19 RX ADMIN — SODIUM CHLORIDE, POTASSIUM CHLORIDE, SODIUM LACTATE AND CALCIUM CHLORIDE 500 ML: 600; 310; 30; 20 INJECTION, SOLUTION INTRAVENOUS at 22:06

## 2019-08-19 RX ADMIN — ACETAMINOPHEN 650 MG: 650 SUPPOSITORY RECTAL at 05:10

## 2019-08-19 RX ADMIN — POTASSIUM CHLORIDE 10 MEQ: 7.46 INJECTION, SOLUTION INTRAVENOUS at 03:59

## 2019-08-19 RX ADMIN — PROPOFOL 20 MCG/KG/MIN: 10 INJECTION, EMULSION INTRAVENOUS at 12:59

## 2019-08-19 RX ADMIN — CISATRACURIUM BESYLATE 0.5 MCG/KG/MIN: 10 INJECTION, SOLUTION INTRAVENOUS at 14:44

## 2019-08-19 RX ADMIN — FENTANYL CITRATE 50 MCG: 50 INJECTION, SOLUTION INTRAMUSCULAR; INTRAVENOUS at 14:51

## 2019-08-19 RX ADMIN — LEVOFLOXACIN 750 MG: 5 INJECTION, SOLUTION INTRAVENOUS at 09:40

## 2019-08-19 RX ADMIN — VECURONIUM BROMIDE 10 MG: 1 INJECTION, POWDER, LYOPHILIZED, FOR SOLUTION INTRAVENOUS at 13:22

## 2019-08-19 RX ADMIN — SODIUM CHLORIDE, PRESERVATIVE FREE 10 ML: 5 INJECTION INTRAVENOUS at 08:17

## 2019-08-19 RX ADMIN — ACETAMINOPHEN 650 MG: 650 SUPPOSITORY RECTAL at 20:07

## 2019-08-19 RX ADMIN — FOLIC ACID 1 MG: 5 INJECTION, SOLUTION INTRAMUSCULAR; INTRAVENOUS; SUBCUTANEOUS at 08:15

## 2019-08-19 RX ADMIN — SODIUM CHLORIDE 250 ML: 9 INJECTION, SOLUTION INTRAVENOUS at 02:48

## 2019-08-19 ASSESSMENT — PULMONARY FUNCTION TESTS
PIF_VALUE: 16
PIF_VALUE: 24
PIF_VALUE: 17
PIF_VALUE: 30
PIF_VALUE: 30
PIF_VALUE: 20
PIF_VALUE: 17
PIF_VALUE: 27
PIF_VALUE: 27

## 2019-08-19 ASSESSMENT — ENCOUNTER SYMPTOMS
BLOOD IN STOOL: 0
VOMITING: 0
CHEST TIGHTNESS: 0
DIARRHEA: 0
NAUSEA: 0
WHEEZING: 0
SORE THROAT: 0
RHINORRHEA: 0
SHORTNESS OF BREATH: 1
CONSTIPATION: 0
COUGH: 1
SINUS PRESSURE: 0
ABDOMINAL PAIN: 0

## 2019-08-19 ASSESSMENT — PAIN SCALES - GENERAL
PAINLEVEL_OUTOF10: 0
PAINLEVEL_OUTOF10: 0

## 2019-08-19 NOTE — FLOWSHEET NOTE
Writer began 1st unit of blood administration as soon as the pt arrived to the floor from ED. Blood reached vein @ 0247. Blood administration stopped @ 0300 per Dr. Kashif Do d/t increasing temperatures/fever of 38.7.     Andriy Rajan, RN

## 2019-08-19 NOTE — PROGRESS NOTES
Patient intubated by Critical Care MD with Dr. Bebeto Hays present. #7.5 ET tube placed at 23 cm at lip following pre-oxygenation. Position of ET tube verified by positive color change of ETC02 detector, Positive bi-lat breath sounds, CXR pending.  Placed on mechanical ventilation settings per Dr. Bebeto Hays.

## 2019-08-19 NOTE — SIGNIFICANT EVENT
fibrinogen, fibrin split products. We will check MIKE and ANCA, peripheral smear by pathologist order stat again. I have discussed with Dr. Kenan Avalos hematology who will review the peripheral smear himself to look for schistocytes fragmented RBCs DIC or TTP. Stat to bag of platelets ordered and blood bank was called. Will monitor hemoglobin hematocrit after transfusions. Patient need emergent bronchoscopy to determine source of bleeding and to get bronchoalveolar lavage. Before intubation I discussed with the patient about intubation she agrees and also discussed with her about bronchoscopy as it needed emergently. Additional critical care time spent more than 30 minutes excluding procedure.     Rama Rouse MD  8/19/2019 10:22 PM

## 2019-08-19 NOTE — CONSULTS
37  Mycoplasma IgM Elevated    Cultures:  · Viral respiratory 8/19/19: pending  · MRSA probe 8/19/19: pending  · Respiratory culture 8/19/19: mixed bacterial morphotypes  Urine:  ·   Blood:  ·   Sputum :  ·   Wound:  ·     Discussed with patient, RN. ICU care 35 min  I have personally reviewed the past medical history, past surgical history, medications, social history, and family history, and I have updated the database accordingly. Past Medical History:     Past Medical History:   Diagnosis Date    Sickle cell beta thalassemia ([facility] [address]        Past Surgical  History:   No past surgical history on file.     Medications:      sodium chloride  250 mL Intravenous Once    sodium chloride flush  10 mL Intravenous 2 times per day    sodium chloride  250 mL Intravenous Once    folic acid IVPB  1 mg Intravenous Daily    levofloxacin  750 mg Intravenous Q24H       Social History:     Social History     Socioeconomic History    Marital status:      Spouse name: Not on file    Number of children: Not on file    Years of education: Not on file    Highest education level: Not on file   Occupational History    Not on file   Social Needs    Financial resource strain: Not on file    Food insecurity:     Worry: Not on file     Inability: Not on file    Transportation needs:     Medical: Not on file     Non-medical: Not on file   Tobacco Use    Smoking status: Not on file   Substance and Sexual Activity    Alcohol use: Not on file    Drug use: Not on file    Sexual activity: Not on file   Lifestyle    Physical activity:     Days per week: Not on file     Minutes per session: Not on file    Stress: Not on file   Relationships    Social connections:     Talks on phone: Not on file     Gets together: Not on file     Attends Sikh service: Not on file     Active member of club or organization: Not on file     Attends meetings of clubs or organizations: Not on file     Relationship status: Not on file   Saji Garcia performed by me. I have reviewed the laboratory data, other diagnostic studies and discussed them with the residents. I have updated the medical record where necessary. I agree with the assessment, plan and orders as documented by the resident.     Arianna Maier MD.      Pager: [phone] - Office: [phone]

## 2019-08-19 NOTE — PLAN OF CARE
Problem: Airway Clearance - Ineffective:  Goal: Ability to maintain a clear airway will improve  Description  Ability to maintain a clear airway will improve  Outcome: Ongoing     Problem: Anxiety/Stress:  Goal: Level of anxiety will decrease  Description  Level of anxiety will decrease  Outcome: Ongoing     Problem: Aspiration:  Goal: Absence of aspiration  Description  Absence of aspiration  Outcome: Ongoing     Problem: Fluid Volume - Imbalance:  Goal: Absence of imbalanced fluid volume signs and symptoms  Description  Absence of imbalanced fluid volume signs and symptoms  Outcome: Ongoing     Problem: Gas Exchange - Impaired:  Goal: Levels of oxygenation will improve  Description  Levels of oxygenation will improve  Outcome: Ongoing     Problem: Mental Status - Impaired:  Goal: Mental status will be restored to baseline  Description  Mental status will be restored to baseline  Outcome: Ongoing     Problem: Tissue Perfusion, Altered:  Goal: Circulatory function within specified parameters  Description  Circulatory function within specified parameters  Outcome: Ongoing     Problem:  Activity:  Goal: Fatigue will decrease  Description  Fatigue will decrease  Outcome: Ongoing  Goal: Ability to tolerate increased activity will improve  Description  Ability to tolerate increased activity will improve  Outcome: Ongoing  Goal: Ability to maintain optimal joint mobility will improve  Description  Ability to maintain optimal joint mobility will improve  Outcome: Ongoing

## 2019-08-19 NOTE — PROCEDURES
PROCEDURE NOTE - EMERGENCY INTUBATION    PATIENT NAME: Izabella [address] RECORD NO. [id number]  DATE: 8/19/2019  ATTENDING PHYSICIAN: Latoya Macias MD    PREOPERATIVE DIAGNOSIS:  Acute Respiratory Failure  POSTOPERATIVE DIAGNOSIS:  Same  PROCEDURE PERFORMED:  Emergency endotracheal intubation  PERFORMING PHYSICIAN: Carley Olivares MD    MEDICATIONS: etomidate intravenously and succinycholine intravenously    DISCUSSION:  Nola Ramirez is a 29y.o.-year-old female who requires intubation and ventilatory support due to impending respiratory failure, potential airway compromise and airway protection. The history and physical examination were reviewed and confirmed. CONSENT: The patient provided verbal consent for this procedure. PROCEDURE:  A timeout was initiated by the bedside nurse and was confirmed by those present. The patient was placed in the appropriate position. Cricoid pressure was not required. Laryngoscopy was performed with a Glidescope. The posterior oropharynx and superior trachea were filled with sanguinous fluid. The cords were not visualizable due to the amount of bloody fluid. A 7.5 cuffed endotracheal tube was passed into the trachea. The cuff was then inflated and the tube was secured appropriately at a distance of 23 cm to the lip. She was connected to the BVM and initial confirmation of placement included bilateral breath sounds, an end tidal CO2 detector, tube fogging and adequate chest rise. The patient had copious bloody secretions coming from the tube immediately. A chest x-ray to verify correct placement of the tube showed appropriate tube position. Initial vent settings: FiO2 100%, PEEP 10, RR 30, TV 470mL    The patient tolerated the procedure well. COMPLICATIONS:  None     Carley Olivares MD  1:28 PM, 8/19/19    I was present and supervise the procedure. No immediate complications.   She has blood seen in the upper airway and around the glottis and epiglottis

## 2019-08-19 NOTE — CONSULTS
Today's Date: 2019  Patient Name: Jackelyn See  Date of admission: 2019 10:32 PM  Patient's age: 29 y.o., 1985  Admission Dx: Sickle cell crisis ([facility] [D57.00]      Requesting Physician: Karmen Pacheco MD    CHIEF COMPLAINT: Acute respiratory failure, sickle cell disease. History Obtained From:  electronic medical record    HISTORY OF PRESENT ILLNESS:      The patient is a 29 y.o.  female who is admitted to the hospital for progressive respiratory failure, she was found to have pancytopenia and was intubated in ICU. The patient had significant hemoptysis and was diagnosed with diffuse alveolar hemorrhage. The patient does not live in town and she was on a travel bus. So I do not have any previous information from her. Patient is intubated in ICU and all information were obtained from the chart. She is originally from [place] and she had a diagnosis of sickle cell/thalassemia trait. She has very infrequent pain crises. Past Medical History:   has a past medical history of Sickle cell beta thalassemia ([facility] [address] Past Surgical History:   has no past surgical history on file. Family History: family history is not on file. Social History:      Medications:    Reviewed in EPIC     Allergies:  Patient has no known allergies. REVIEW OF SYSTEMS:      Review of Systems  Unobtainable              PHYSICAL EXAM:      /69   Pulse 102   Temp 99.9 °F (37.7 °C) (Oral)   Resp 26   Ht 5' 6\" (1.676 m)   Wt 181 lb 7 oz (82.3 kg)   SpO2 100%   BMI 29.28 kg/m²    Temp (24hrs), Av °F (37.8 °C), Min:98.2 °F (36.8 °C), Max:102 °F (38.9 °C)      Physical Exam  General appearance - intubated and sedated.    Eyes - pupils equal and reactive,  Ears - bilateral TM's and external ear canals normal  Mouth - mucous membranes moist, ETT in place   Neck - supple, no significant causes especially mycoplasma have been described. 3. I recommend supportive care and transfusion since the patient is actively bleeding in her lungs. 4. Case was discussed with Dr. Bebeto Hays from ICU, transfuse to keep the platelets above 01,062 and hemoglobin above 7.  5. The possibility of pancytopenia secondary to sepsis is unlikely but not completely excluded  6. DIC is quite unlikely considering elevated fibrinogen and normal PT and PTT. Discussed with ICU team  and Nurse.     Robert Duke MD   [phone]

## 2019-08-19 NOTE — FLOWSHEET NOTE
Patient continues to have labored breathing while on the bi-pap. The decision was made to intubate. The patient's  was contacted before intubating patient. Dr. Lamberto Steinberg intubated patient. Respiratory, writer, charge nurse, pharmacist, and resident at bedside. 1255: 20mg Etomidate given  1256: 100mg Succinylcholine given  1257: positive color change, # 7.5 ETT at 24cm  1300: OG insert    Chest x-ray was obtained. At 9640 6569 a timeout was performed for bedside bronch.

## 2019-08-19 NOTE — ED PROVIDER NOTES
[address]  ED  Emergency Department Encounter  Emergency Medicine Resident     Pt Name: Dimitri Riedel  MRN: [id number]  [place] 1985  Date of evaluation: 8/18/19  PCP:  No primary care provider on file. CHIEF COMPLAINT       Chief Complaint   Patient presents with    Fatigue     Tx from [facility]. Not feeling well since Thursday.  Shortness of Breath     on 4L O2       HISTORY OFPRESENT ILLNESS  (Location/Symptom, Timing/Onset, Context/Setting, Quality, Duration, Modifying Factors,Severity.)      Dimitri Riedel is a 36-year-old female who presents with fever. Patient has been traveling from [place] to [place] with a group trip.  3 days prior to presentation she was at a hospital in [place] for sickle cell pain. Approximately half day prior to presentation, patient developed a fever presented to an outside hospital, transferred to [facility] emergency department for suspected pneumonia. On arrival, patient was tachycardic and tachypneic. Patient has history significant for sickle cell SS and beta thalassemia. Patient has had a sickle cell crisis in the past and reports that this feels similar. She has been having a productive cough and fever recently. This is associated with fatigue and weakness. Patient denies nausea, vomiting, chest pain, abdominal pain, extremity pain, dysuria, hematuria. PAST MEDICAL / SURGICAL / SOCIAL / FAMILY HISTORY      has a past medical history of Sickle cell beta thalassemia ([facility] [address] has no past surgical history on file.      Social History     Socioeconomic History    Marital status:      Spouse name: Not on file    Number of children: Not on file    Years of education: Not on file    Highest education level: Not on file   Occupational History    Not on file   Social Needs    Financial resource strain: Not on file    Food insecurity:     Worry: Not on file     Inability: Not on file    Transportation needs: Transfusion reaction evaluation    PATIENT STATUS (FROM ED OR OR/PROCEDURAL) Inpatient       MEDICATIONS ORDERED:  Orders Placed This Encounter   Medications    0.9 % sodium chloride bolus    vancomycin (VANCOCIN) 1250 mg in dextrose 5 % 250 mL IVPB    piperacillin-tazobactam (ZOSYN) 4.5 g in dextrose 5 % 100 mL IVPB (mini-bag)    ioversol (OPTIRAY) 74 % injection 75 mL    0.9 % sodium chloride bolus    calcium gluconate 10 % injection 1 g    potassium chloride 10 mEq/100 mL IVPB (Peripheral Line)    sodium chloride flush 0.9 % injection 10 mL    sodium chloride flush 0.9 % injection 10 mL    magnesium hydroxide (MILK OF MAGNESIA) 400 MG/5ML suspension 30 mL    ondansetron (ZOFRAN) injection 4 mg    DISCONTD: enoxaparin (LOVENOX) injection 40 mg    0.9 % sodium chloride infusion    potassium chloride 10 mEq/100 mL IVPB (Peripheral Line)    magnesium sulfate 1 g in dextrose 5% 100 mL IVPB    acetaminophen (TYLENOL) tablet 650 mg    amLODIPine (NORVASC) tablet 5 mg    LORazepam (ATIVAN) injection 1 mg    DISCONTD: levofloxacin (LEVAQUIN) 750 MG/150ML infusion 750 mg    DISCONTD: acetaminophen (TYLENOL) suppository 650 mg    0.9 % sodium chloride bolus    acetaminophen (TYLENOL) suppository 655 mg    folic acid 1 mg in dextrose 5 % 50 mL IVPB    levofloxacin (LEVAQUIN) 750 MG/150ML infusion 750 mg       DDX: Differential includes sickle cell crisis, pneumonia, PE      DIAGNOSTIC RESULTS / EMERGENCYDEPARTMENT COURSE / MDM     LABS:  Labs Reviewed   CBC WITH AUTO DIFFERENTIAL - Abnormal; Notable for the following components:       Result Value    WBC 12.0 (*)     RBC 2.28 (*)     Hemoglobin 5.5 (*)     Hematocrit 16.6 (*)     MCV 72.8 (*)     MCH 24.1 (*)     RDW 15.4 (*)     NRBC Automated 33.7 (*)     Immature Granulocytes 2 (*)     Eosinophils % 0 (*)     nRBC 48 (*)     All other components within normal limits   BASIC METABOLIC PANEL W/ REFLEX TO MG FOR LOW K - Abnormal; Notable for the

## 2019-08-19 NOTE — ED NOTES
Pt. Arrived back from CT, pt.  Attached to monitor & on 15 L O2, will con't to monitor     Mikel Mccain RN  08/18/19 5423

## 2019-08-19 NOTE — PROGRESS NOTES
Nutrition Assessment     Type and Reason for Visit: Initial, Consult(TF recommendations d/t vent )    Nutrition Recommendations: Start nutrition as able. If TF needed, suggest Low Chapo, High Pro formula with goal rate of 45 mL/hr while on propofol at current rate. Will follow. Nutrition Assessment: Pt nutritionally compromised as evidenced by intubation and NPO status. Will provide TF recommendations. Malnutrition Assessment:  · Malnutrition Status: Insufficient data    Nutrition Risk Level: High    Nutrition Needs:  · Estimated Daily Total Kcal: 9623-7769 kcal/day   · Estimated Daily Protein (g): 90 g pro/day     Nutrition Diagnosis:   · Problem: Inadequate oral intake  · Etiology: related to Impaired respiratory function-inability to consume food     Signs and symptoms:  as evidenced by NPO status due to medical condition    Objective Information:  · Current Nutrition Therapies:  · Oral Diet Orders: NPO   · Tube Feeding (TF) Orders: None  · Additional Calories: Propofol at 24.6 ml/po=931 kcal/day  · Anthropometric Measures:  · Ht: 5' 6\" (167.6 cm)   · Current Body Wt: 181 lb 7 oz (82.3 kg)  · Ideal Body Wt: 130 lb (59 kg), % Ideal Body 139%  · BMI Classification: BMI 30.0 - 34.9 Obese Class I    Nutrition Interventions:   Start nutrition as able. If TF needed, suggest Low Chapo, High Pro formula with goal rate of 45 mL/hr while on propofol at current rate.    Continued Inpatient Monitoring, Education Not Indicated    Nutrition Evaluation:   · Evaluation: Goals set   · Goals: meet % of estimated nutrition needs    · Monitoring: Nutrition Progression, Weight, Pertinent Labs      Electronically signed by Barrie Grimaldo RD, LD on 8/19/19 at 4:21 PM    Contact Number: [phone]

## 2019-08-19 NOTE — H&P
Critical Care - History and Physical Examination    Patient's name:  Pankaj Callejas Record Number: [id number]  Patient's account/billing number: [de-identified]  Patient's YOB: 1985  Age: 29 y.o. Date of Admission: 8/18/2019 10:32 PM  Date of History and Physical Examination: 8/19/2019      Primary Care Physician: No primary care provider on file. Attending Physician: Dr. Humberto Araiza Status: No Order    Chief complaint:   Chief Complaint   Patient presents with    Fatigue     Tx from [facility]. Not feeling well since Thursday.  Shortness of Breath     on 4L O2     HISTORY OF PRESENT ILLNESS:      History was obtained from the patient. Nola Ramirez is a 29 y.o. with PMH of sickle cell anemia, beta thalassemia trait. She presented to the ER with nonproductive cough, chills, generalized weakness. She lives in [place] for the past 2 years. Went to [place] for about a week for [employer]. On 8/14/2019, she started experiencing chills and generalized pains, worse in her legs. On Thursday, she was taken to the ED was found to be anemic. Given IV fluids and Vicodin then returned to [place]. On 8/18/2019, she left [place] and was on a bus back to [place] when she developed nonproductive cough, generalized weakness and overall malaise. A member of the group cold the EMS and she was taken to [facility].  She was given 3 L bolus, IV antibiotics. Her hemoglobin was 6.8 (did not receive any blood). Based on report, chest x-ray showed bilateral opacities. She denied any sore throat, chest pain, palpitations, productive cough, dysuria, melena, vomiting or sick contacts. On arrival to [facility], she was placed on 5 L nonrebreather mask. O2 saturations in the 90s. She was switched to BiPAP as she was very tachypneic. She received 2 L normal saline bolus, calcium gluconate, vancomycin and Zosyn. Hemoglobin came back as 5.5; she received 1 unit PRBC.

## 2019-08-19 NOTE — ED PROVIDER NOTES
of the chest to rule out PE. Will check labs. Will start antibiotics. Will speak with hematology as patient's hemoglobin at [facility] was in the sixes. We will also speak with critical care for ICU admission.     EKG Interpretation    Interpreted by emergency department physician    Rhythm: sinus tachycardia  Rate: 120-130  Axis: normal  Ectopy: none  Conduction: normal  ST Segments: normal  T Waves: normal  Q Waves: none    Clinical Impression: sinus tachycardia    Betty Higuera MD  Attending Emergency  Physician              Bonita Ribeiro MD  08/18/19 0464       Bonita Ribeiro MD  08/19/19 3144

## 2019-08-19 NOTE — FLOWSHEET NOTE
RN began blood administration @ 0245. Pt's temperature was 36.8 degrees Celsius. At 0251 pt's temperature was 37 degrees Celsius. At 0256 pt's temperature was 37.9 degrees Celsius and RN notified Dr. Paul Landers of change in temp d/t possible transfusion rxn. Dr. Cristie Felty instructed RN to continue to monitor and check 1 more temperature. At 0300, pt's temperature was 38.9 degrees Celsius. RN notified Dr. Paul Landers again of another jump in temperature in which Dr. Cristie Felty instructed RN to stop infusion. RN stopped infusion @ 0301.    \      Rosita Liang RN

## 2019-08-20 ENCOUNTER — APPOINTMENT (OUTPATIENT)
Dept: GENERAL RADIOLOGY | Age: 34
DRG: 207 | End: 2019-08-20
Payer: COMMERCIAL

## 2019-08-20 LAB
ABSOLUTE EOS #: 0 K/UL (ref 0–0.4)
ABSOLUTE IMMATURE GRANULOCYTE: 0.09 K/UL (ref 0–0.3)
ABSOLUTE LYMPH #: 1.37 K/UL (ref 1–4.8)
ABSOLUTE MONO #: 0.64 K/UL (ref 0.1–0.8)
ALBUMIN SERPL-MCNC: 2.7 G/DL (ref 3.5–5.2)
ALBUMIN/GLOBULIN RATIO: 1 (ref 1–2.5)
ALLEN TEST: NORMAL
ALP BLD-CCNC: 67 U/L (ref 35–104)
ALT SERPL-CCNC: 16 U/L (ref 5–33)
ANA REFERENCE RANGE:: ABNORMAL
ANCA MYELOPEROXIDASE: 10 AU/ML
ANCA PROTEINASE 3: 12 AU/ML
ANION GAP SERPL CALCULATED.3IONS-SCNC: 10 MMOL/L (ref 9–17)
ANTI DNA DOUBLE STRANDED: 130 IU/ML
ANTI JO-1 IGG: 21 U/ML
ANTI RNP AB: 94 U/ML
ANTI SSA: 17 U/ML
ANTI SSB: 17 U/ML
ANTI-CENTROMERE: 6 U/ML
ANTI-NUCLEAR ANTIBODY (ANA): POSITIVE
ANTI-SCLERODERMA: 32 U/ML
ANTI-SMITH: 16 U/ML
AST SERPL-CCNC: 27 U/L
BASOPHILS # BLD: 0 % (ref 0–2)
BASOPHILS ABSOLUTE: 0 K/UL (ref 0–0.2)
BILIRUB SERPL-MCNC: 2.63 MG/DL (ref 0.3–1.2)
BILIRUBIN DIRECT: 1.1 MG/DL
BILIRUBIN, INDIRECT: 1.53 MG/DL (ref 0–1)
BLD PROD TYP BPU: NORMAL
BLD PROD TYP BPU: NORMAL
BUN BLDV-MCNC: 7 MG/DL (ref 6–20)
BUN/CREAT BLD: ABNORMAL (ref 9–20)
CALCIUM SERPL-MCNC: 7.4 MG/DL (ref 8.6–10.4)
CHLORIDE BLD-SCNC: 110 MMOL/L (ref 98–107)
CO2: 25 MMOL/L (ref 20–31)
COMPLEMENT C3: 111 MG/DL (ref 90–180)
COMPLEMENT C4: 15 MG/DL (ref 10–40)
CREAT SERPL-MCNC: 0.32 MG/DL (ref 0.5–0.9)
CULTURE: ABNORMAL
DIFFERENTIAL TYPE: ABNORMAL
DIRECT EXAM: ABNORMAL
DIRECT EXAM: NORMAL
DISPENSE STATUS BLOOD BANK: NORMAL
DISPENSE STATUS BLOOD BANK: NORMAL
EKG ATRIAL RATE: 126 BPM
EKG P AXIS: 55 DEGREES
EKG P-R INTERVAL: 144 MS
EKG Q-T INTERVAL: 322 MS
EKG QRS DURATION: 76 MS
EKG QTC CALCULATION (BAZETT): 466 MS
EKG R AXIS: 45 DEGREES
EKG T AXIS: 45 DEGREES
EKG VENTRICULAR RATE: 126 BPM
EOSINOPHILS RELATIVE PERCENT: 0 % (ref 1–4)
FDP: >20 UG/ML
FIO2: 50
GFR AFRICAN AMERICAN: >60 ML/MIN
GFR NON-AFRICAN AMERICAN: >60 ML/MIN
GFR SERPL CREATININE-BSD FRML MDRD: ABNORMAL ML/MIN/{1.73_M2}
GFR SERPL CREATININE-BSD FRML MDRD: ABNORMAL ML/MIN/{1.73_M2}
GLOBULIN: ABNORMAL G/DL (ref 1.5–3.8)
GLUCOSE BLD-MCNC: 124 MG/DL (ref 70–99)
HCT VFR BLD CALC: 22.6 % (ref 36.3–47.1)
HEMOGLOBIN: 7.7 G/DL (ref 11.9–15.1)
HISTONE ANTIBODY: 14 U/ML
IMMATURE GRANULOCYTES: 1 %
INR BLD: 1
LYMPHOCYTES # BLD: 15 % (ref 24–44)
Lab: ABNORMAL
Lab: NORMAL
MAGNESIUM: 1.8 MG/DL (ref 1.6–2.6)
MCH RBC QN AUTO: 25.6 PG (ref 25.2–33.5)
MCHC RBC AUTO-ENTMCNC: 34.1 G/DL (ref 28.4–34.8)
MCV RBC AUTO: 75.1 FL (ref 82.6–102.9)
MODE: NORMAL
MONOCYTES # BLD: 7 % (ref 1–7)
MORPHOLOGY: ABNORMAL
MORPHOLOGY: ABNORMAL
NEGATIVE BASE EXCESS, ART: NORMAL (ref 0–2)
NRBC AUTOMATED: 19.9 PER 100 WBC
NUCLEATED RED BLOOD CELLS: 19 PER 100 WBC
O2 DEVICE/FLOW/%: NORMAL
PATIENT TEMP: 37.2
PDW BLD-RTO: 17.7 % (ref 11.8–14.4)
PLATELET # BLD: ABNORMAL K/UL (ref 138–453)
PLATELET ESTIMATE: ABNORMAL
PLATELET, FLUORESCENCE: 48 K/UL (ref 138–453)
PLATELET, IMMATURE FRACTION: 8.6 % (ref 1.1–10.3)
PMV BLD AUTO: ABNORMAL FL (ref 8.1–13.5)
POC HCO3: 26.5 MMOL/L (ref 21–28)
POC O2 SATURATION: 96 % (ref 94–98)
POC PCO2 TEMP: NORMAL MM HG
POC PCO2: 43.8 MM HG (ref 35–48)
POC PH TEMP: NORMAL
POC PH: 7.39 (ref 7.35–7.45)
POC PO2 TEMP: NORMAL MM HG
POC PO2: 87.1 MM HG (ref 83–108)
POSITIVE BASE EXCESS, ART: 1 (ref 0–3)
POTASSIUM SERPL-SCNC: 3.4 MMOL/L (ref 3.7–5.3)
PROTHROMBIN TIME: 10.8 SEC (ref 9–12)
RBC # BLD: 3.01 M/UL (ref 3.95–5.11)
RBC # BLD: ABNORMAL 10*6/UL
SAMPLE SITE: NORMAL
SEG NEUTROPHILS: 77 % (ref 36–66)
SEGMENTED NEUTROPHILS ABSOLUTE COUNT: 7 K/UL (ref 1.8–7.7)
SODIUM BLD-SCNC: 145 MMOL/L (ref 135–144)
SPECIMEN DESCRIPTION: ABNORMAL
SPECIMEN DESCRIPTION: NORMAL
SURGICAL PATHOLOGY REPORT: NORMAL
SURGICAL PATHOLOGY REPORT: NORMAL
TCO2 (CALC), ART: 28 MMOL/L (ref 22–29)
TOTAL PROTEIN: 5.4 G/DL (ref 6.4–8.3)
TRANSFUSION STATUS: NORMAL
TRANSFUSION STATUS: NORMAL
UNIT DIVISION: 0
UNIT DIVISION: 0
UNIT NUMBER: NORMAL
UNIT NUMBER: NORMAL
WBC # BLD: 9.1 K/UL (ref 3.5–11.3)
WBC # BLD: ABNORMAL 10*3/UL

## 2019-08-20 PROCEDURE — 85730 THROMBOPLASTIN TIME PARTIAL: CPT

## 2019-08-20 PROCEDURE — 2500000003 HC RX 250 WO HCPCS: Performed by: STUDENT IN AN ORGANIZED HEALTH CARE EDUCATION/TRAINING PROGRAM

## 2019-08-20 PROCEDURE — 86160 COMPLEMENT ANTIGEN: CPT

## 2019-08-20 PROCEDURE — 99233 SBSQ HOSP IP/OBS HIGH 50: CPT | Performed by: INTERNAL MEDICINE

## 2019-08-20 PROCEDURE — 6360000002 HC RX W HCPCS: Performed by: STUDENT IN AN ORGANIZED HEALTH CARE EDUCATION/TRAINING PROGRAM

## 2019-08-20 PROCEDURE — 71045 X-RAY EXAM CHEST 1 VIEW: CPT

## 2019-08-20 PROCEDURE — 2700000000 HC OXYGEN THERAPY PER DAY

## 2019-08-20 PROCEDURE — 37799 UNLISTED PX VASCULAR SURGERY: CPT

## 2019-08-20 PROCEDURE — 93010 ELECTROCARDIOGRAM REPORT: CPT | Performed by: INTERNAL MEDICINE

## 2019-08-20 PROCEDURE — 94762 N-INVAS EAR/PLS OXIMTRY CONT: CPT

## 2019-08-20 PROCEDURE — 86147 CARDIOLIPIN ANTIBODY EA IG: CPT

## 2019-08-20 PROCEDURE — 82803 BLOOD GASES ANY COMBINATION: CPT

## 2019-08-20 PROCEDURE — 94003 VENT MGMT INPAT SUBQ DAY: CPT

## 2019-08-20 PROCEDURE — 80048 BASIC METABOLIC PNL TOTAL CA: CPT

## 2019-08-20 PROCEDURE — 94770 HC ETCO2 MONITOR DAILY: CPT

## 2019-08-20 PROCEDURE — 2580000003 HC RX 258: Performed by: STUDENT IN AN ORGANIZED HEALTH CARE EDUCATION/TRAINING PROGRAM

## 2019-08-20 PROCEDURE — 85025 COMPLETE CBC W/AUTO DIFF WBC: CPT

## 2019-08-20 PROCEDURE — 84484 ASSAY OF TROPONIN QUANT: CPT

## 2019-08-20 PROCEDURE — 6370000000 HC RX 637 (ALT 250 FOR IP): Performed by: STUDENT IN AN ORGANIZED HEALTH CARE EDUCATION/TRAINING PROGRAM

## 2019-08-20 PROCEDURE — 85610 PROTHROMBIN TIME: CPT

## 2019-08-20 PROCEDURE — 99291 CRITICAL CARE FIRST HOUR: CPT | Performed by: INTERNAL MEDICINE

## 2019-08-20 PROCEDURE — 36415 COLL VENOUS BLD VENIPUNCTURE: CPT

## 2019-08-20 PROCEDURE — 93005 ELECTROCARDIOGRAM TRACING: CPT | Performed by: INTERNAL MEDICINE

## 2019-08-20 PROCEDURE — 99232 SBSQ HOSP IP/OBS MODERATE 35: CPT | Performed by: INTERNAL MEDICINE

## 2019-08-20 PROCEDURE — 2000000000 HC ICU R&B

## 2019-08-20 PROCEDURE — 85613 RUSSELL VIPER VENOM DILUTED: CPT

## 2019-08-20 PROCEDURE — 85055 RETICULATED PLATELET ASSAY: CPT

## 2019-08-20 PROCEDURE — 83735 ASSAY OF MAGNESIUM: CPT

## 2019-08-20 PROCEDURE — 80076 HEPATIC FUNCTION PANEL: CPT

## 2019-08-20 RX ORDER — FENTANYL CITRATE 50 UG/ML
25 INJECTION, SOLUTION INTRAMUSCULAR; INTRAVENOUS
Status: DISCONTINUED | OUTPATIENT
Start: 2019-08-20 | End: 2019-08-28 | Stop reason: HOSPADM

## 2019-08-20 RX ORDER — FENTANYL CITRATE 50 UG/ML
50 INJECTION, SOLUTION INTRAMUSCULAR; INTRAVENOUS
Status: DISCONTINUED | OUTPATIENT
Start: 2019-08-20 | End: 2019-08-28 | Stop reason: HOSPADM

## 2019-08-20 RX ORDER — SODIUM CHLORIDE, SODIUM LACTATE, POTASSIUM CHLORIDE, AND CALCIUM CHLORIDE .6; .31; .03; .02 G/100ML; G/100ML; G/100ML; G/100ML
500 INJECTION, SOLUTION INTRAVENOUS ONCE
Status: COMPLETED | OUTPATIENT
Start: 2019-08-21 | End: 2019-08-21

## 2019-08-20 RX ORDER — CALCIUM GLUCONATE 94 MG/ML
1 INJECTION, SOLUTION INTRAVENOUS ONCE
Status: DISCONTINUED | OUTPATIENT
Start: 2019-08-20 | End: 2019-08-20

## 2019-08-20 RX ORDER — SODIUM CHLORIDE, SODIUM LACTATE, POTASSIUM CHLORIDE, AND CALCIUM CHLORIDE .6; .31; .03; .02 G/100ML; G/100ML; G/100ML; G/100ML
1000 INJECTION, SOLUTION INTRAVENOUS ONCE
Status: COMPLETED | OUTPATIENT
Start: 2019-08-20 | End: 2019-08-21

## 2019-08-20 RX ORDER — SODIUM CHLORIDE, SODIUM LACTATE, POTASSIUM CHLORIDE, AND CALCIUM CHLORIDE .6; .31; .03; .02 G/100ML; G/100ML; G/100ML; G/100ML
500 INJECTION, SOLUTION INTRAVENOUS ONCE
Status: COMPLETED | OUTPATIENT
Start: 2019-08-20 | End: 2019-08-20

## 2019-08-20 RX ADMIN — SODIUM CHLORIDE, POTASSIUM CHLORIDE, SODIUM LACTATE AND CALCIUM CHLORIDE 500 ML: 600; 310; 30; 20 INJECTION, SOLUTION INTRAVENOUS at 23:30

## 2019-08-20 RX ADMIN — PROPOFOL 35 MCG/KG/MIN: 10 INJECTION, EMULSION INTRAVENOUS at 20:23

## 2019-08-20 RX ADMIN — PROPOFOL 50 MCG/KG/MIN: 10 INJECTION, EMULSION INTRAVENOUS at 03:50

## 2019-08-20 RX ADMIN — PROPOFOL 40 MCG/KG/MIN: 10 INJECTION, EMULSION INTRAVENOUS at 11:26

## 2019-08-20 RX ADMIN — ACETAMINOPHEN 650 MG: 325 TABLET ORAL at 14:29

## 2019-08-20 RX ADMIN — CISATRACURIUM BESYLATE 2.5 MCG/KG/MIN: 10 INJECTION, SOLUTION INTRAVENOUS at 07:39

## 2019-08-20 RX ADMIN — POTASSIUM CHLORIDE 10 MEQ: 7.46 INJECTION, SOLUTION INTRAVENOUS at 09:05

## 2019-08-20 RX ADMIN — CALCIUM GLUCONATE 1 G: 98 INJECTION, SOLUTION INTRAVENOUS at 08:37

## 2019-08-20 RX ADMIN — POTASSIUM CHLORIDE 10 MEQ: 7.46 INJECTION, SOLUTION INTRAVENOUS at 05:32

## 2019-08-20 RX ADMIN — Medication 125 MCG/HR: at 09:38

## 2019-08-20 RX ADMIN — FENTANYL CITRATE 25 MCG: 50 INJECTION, SOLUTION INTRAMUSCULAR; INTRAVENOUS at 04:21

## 2019-08-20 RX ADMIN — SODIUM CHLORIDE, PRESERVATIVE FREE 10 ML: 5 INJECTION INTRAVENOUS at 10:19

## 2019-08-20 RX ADMIN — Medication 5 MCG/MIN: at 23:32

## 2019-08-20 RX ADMIN — POTASSIUM CHLORIDE 10 MEQ: 7.46 INJECTION, SOLUTION INTRAVENOUS at 06:43

## 2019-08-20 RX ADMIN — PROPOFOL 20 MG: 10 INJECTION, EMULSION INTRAVENOUS at 19:16

## 2019-08-20 RX ADMIN — Medication 100 MCG/HR: at 01:31

## 2019-08-20 RX ADMIN — PROPOFOL 20 MG: 10 INJECTION, EMULSION INTRAVENOUS at 04:23

## 2019-08-20 RX ADMIN — PROPOFOL 40 MCG/KG/MIN: 10 INJECTION, EMULSION INTRAVENOUS at 15:34

## 2019-08-20 RX ADMIN — FOLIC ACID 1 MG: 5 INJECTION, SOLUTION INTRAMUSCULAR; INTRAVENOUS; SUBCUTANEOUS at 11:30

## 2019-08-20 RX ADMIN — Medication 50 MCG/HR: at 13:51

## 2019-08-20 RX ADMIN — LEVOFLOXACIN 750 MG: 5 INJECTION, SOLUTION INTRAVENOUS at 09:38

## 2019-08-20 RX ADMIN — SODIUM CHLORIDE, POTASSIUM CHLORIDE, SODIUM LACTATE AND CALCIUM CHLORIDE 1000 ML: 600; 310; 30; 20 INJECTION, SOLUTION INTRAVENOUS at 22:11

## 2019-08-20 RX ADMIN — SODIUM CHLORIDE: 4.5 INJECTION, SOLUTION INTRAVENOUS at 13:11

## 2019-08-20 RX ADMIN — SODIUM CHLORIDE, PRESERVATIVE FREE 10 ML: 5 INJECTION INTRAVENOUS at 20:05

## 2019-08-20 RX ADMIN — SODIUM CHLORIDE 500 ML: 4.5 INJECTION, SOLUTION INTRAVENOUS at 10:18

## 2019-08-20 RX ADMIN — PROPOFOL 50 MCG/KG/MIN: 10 INJECTION, EMULSION INTRAVENOUS at 07:22

## 2019-08-20 RX ADMIN — SODIUM CHLORIDE, POTASSIUM CHLORIDE, SODIUM LACTATE AND CALCIUM CHLORIDE 500 ML: 600; 310; 30; 20 INJECTION, SOLUTION INTRAVENOUS at 01:07

## 2019-08-20 RX ADMIN — ACETAMINOPHEN 650 MG: 650 SUPPOSITORY RECTAL at 20:05

## 2019-08-20 RX ADMIN — POTASSIUM CHLORIDE 10 MEQ: 7.46 INJECTION, SOLUTION INTRAVENOUS at 07:48

## 2019-08-20 ASSESSMENT — PULMONARY FUNCTION TESTS
PIF_VALUE: 31
PIF_VALUE: 29
PIF_VALUE: 27
PIF_VALUE: 28
PIF_VALUE: 32
PIF_VALUE: 27
PIF_VALUE: 27

## 2019-08-20 NOTE — CARE COORDINATION
Spoke with Sonido Campos from infection control. Given info on Mycoplasma Pneumonie. Called Samaria Castaneda and updated her on signs and symptoms to watch for and to contact her PCP with concerns. She will notify the other woman who shared a tent with them.

## 2019-08-20 NOTE — PLAN OF CARE
will achieve/maintain normal respiratory rate/effort  Description  Respiratory rate and effort will be within normal limits for the patient  8/20/2019 1538 by Srikanth Almaraz RN  Outcome: Ongoing  8/20/2019 0604 by Suhas Caldera RN  Outcome: Ongoing     Problem: MECHANICAL VENTILATION  Goal: Patient will maintain patent airway  8/20/2019 0834 by Srikanth Almaraz RN  Outcome: Ongoing  8/20/2019 0604 by Suhas Caldera RN  Outcome: Ongoing  Goal: Oral health is maintained or improved  8/20/2019 0834 by Srikanth Almaraz RN  Outcome: Ongoing  8/20/2019 0604 by Suhas Caldera RN  Outcome: Ongoing  Goal: ET tube will be managed safely  8/20/2019 0834 by Srikanth Almaraz RN  Outcome: Ongoing  8/20/2019 0604 by Suhas Caldera RN  Outcome: Ongoing  Goal: Ability to express needs and understand communication  8/20/2019 0834 by Srikanth Almaraz RN  Outcome: Ongoing  8/20/2019 0604 by Suhas Caldera RN  Outcome: Ongoing  Goal: Mobility/activity is maintained at optimum level for patient  8/20/2019 0834 by Srikanth Almaraz RN  Outcome: Ongoing  8/20/2019 0604 by Suhas Caldera RN  Outcome: Ongoing     Problem: SKIN INTEGRITY  Goal: Skin integrity is maintained or improved  8/20/2019 0834 by Srikanth Almaraz RN  Outcome: Ongoing  8/20/2019 0604 by Suhas Caldera RN  Outcome: Ongoing     Problem: Falls - Risk of:  Goal: Will remain free from falls  Description  Will remain free from falls  Outcome: Ongoing  Goal: Absence of physical injury  Description  Absence of physical injury  Outcome: Ongoing     Problem: Risk for Impaired Skin Integrity  Goal: Tissue integrity - skin and mucous membranes  Description  Structural intactness and normal physiological function of skin and  mucous membranes.   Outcome: Ongoing

## 2019-08-20 NOTE — PROGRESS NOTES
148*  --  145*   K 3.1* 3.6*  --  3.4*    111*  --  110*   CO2 23 20  --  25   BUN 11 10  --  7   CREATININE 0.53 0.55 0.68 0.32*   GLUCOSE 116* 118*  --  124*       Liver Function:  Recent Labs     08/20/19  0400   PROT 5.4*   LABALBU 2.7*   ALT 16   AST 27   ALKPHOS 67   BILITOT 2.63*       Magnesium:   Lab Results   Component Value Date    MG 1.8 08/20/2019    MG 1.9 08/18/2019     Phosphorus: No results found for: PHOS  Ionized Calcium: No results found for: CAION     Urinalysis: Lab Results   Component Value Date    NITRU NEGATIVE 08/19/2019    COLORU YELLOW 08/19/2019    PHUR 5.5 08/19/2019    SPECGRAV 1.012 08/19/2019    LEUKOCYTESUR NEGATIVE 08/19/2019    UROBILINOGEN Normal 08/19/2019    BILIRUBINUR NEGATIVE 08/19/2019    GLUCOSEU NEGATIVE 08/19/2019    KETUA NEGATIVE 08/19/2019       HgBA1c:  No results found for: LABA1C  TSH:    Lab Results   Component Value Date    TSH 1.78 08/19/2019     Lactic Acid:   Lab Results   Component Value Date    LACTA NOT REPORTED 08/18/2019      Troponin: No results for input(s): TROPONINI in the last 72 hours. Other Labs:  Results for orders placed or performed during the hospital encounter of 08/18/19   Culture, Viral Respiratory   Result Value Ref Range    Specimen Description . NASOPHARYNGEAL SWAB     Special Requests NOT REPORTED     Culture       NEGATIVE: HSV-1 DNA not detected by nucleic acid amplification. Culture       NEGATIVE: HSV-2 DNA not detected by nucleic acid amplification. Culture       Due to the specimen source, HSV 1,2 testing was performed by a molecular method. Respiratory Culture   Result Value Ref Range    Specimen Description . INDUCED SPUTUM     Special Requests NOT REPORTED     Direct Exam < 10 EPITHELIAL CELLS/LPF     Direct Exam >25 NEUTROPHILS/LPF     Direct Exam MIXED BACTERIAL MORPHOTYPES SEEN ON GRAM STAIN.  (A)     Culture NORMAL RESPIRATORY JOVAN MODERATE GROWTH    Strep Pneumoniae Antigen   Result Value Ref Range Granulocytes 0 0 %    Seg Neutrophils 70 (H) 36 - 66 %    Lymphocytes 25 24 - 44 %    Monocytes 5 1 - 7 %    Eosinophils % 0 (L) 1 - 4 %    Basophils 0 0 - 2 %    nRBC 27 (H) 0 per 100 WBC    Absolute Immature Granulocyte 0.00 0.00 - 0.30 k/uL    Segs Absolute 5.80 1.8 - 7.7 k/uL    Absolute Lymph # 2.08 1.0 - 4.8 k/uL    Absolute Mono # 0.42 0.1 - 0.8 k/uL    Absolute Eos # 0.00 0.0 - 0.4 k/uL    Basophils # 0.00 0.0 - 0.2 k/uL    Morphology ANISOCYTOSIS PRESENT     Morphology MICROCYTOSIS PRESENT     Morphology 1+ TARGET CELLS     Morphology 1+ ELLIPTOCYTES     Morphology 1+ POLYCHROMASIA    LACTATE DEHYDROGENASE   Result Value Ref Range    LD 1,037 (H) 135 - 214 U/L   Haptoglobin   Result Value Ref Range    Haptoglobin 94 30 - 200 mg/dL   HEPATIC FUNCTION PANEL   Result Value Ref Range    Alb 3.4 (L) 3.5 - 5.2 g/dL    Alkaline Phosphatase 88 35 - 104 U/L    ALT 22 5 - 33 U/L    AST 55 (H) <32 U/L    Total Bilirubin 2.15 (H) 0.3 - 1.2 mg/dL    Bilirubin, Direct 0.40 (H) <0.31 mg/dL    Bilirubin, Indirect 1.75 (H) 0.00 - 1.00 mg/dL    Total Protein 6.2 (L) 6.4 - 8.3 g/dL    Globulin NOT REPORTED 1.5 - 3.8 g/dL    Albumin/Globulin Ratio 1.2 1.0 - 2.5   HEMOGLOBIN AND HEMATOCRIT, BLOOD   Result Value Ref Range    Hemoglobin 7.7 (L) 11.9 - 15.1 g/dL    Hematocrit 23.4 (L) 36.3 - 47.1 %   MYCOPLASMA PNEUMONIAE ANTIBODY, IGM   Result Value Ref Range    Mycoplasma pneumo IgM 1.31 (H) <0.91   TSH with Reflex   Result Value Ref Range    TSH 1.78 0.30 - 5.00 mIU/L   Reticulocytes   Result Value Ref Range    Retic % 2.0 (H) 0.5 - 1.9 %    Absolute Retic # 0.060 0.030 - 0.080 M/uL    Immature Retic Fract 31.700 (H) 2.7 - 18.3 %    Retic Hemoglobin 21.7 (L) 28.2 - 35.7 pg   Immature Platelet Fraction   Result Value Ref Range    Platelet, Immature Fraction 11.1 (H) 1.1 - 10.3 %    Platelet, Fluorescence 36 (L) 138 - 453 k/uL   Hemoglobin and hematocrit, blood   Result Value Ref Range    POC Hemoglobin 5.6 (LL) 12.0 - 16.0 g/dL 8/19/2019 at 00:21. ASSESSMENT:     Patient Active Problem List    Diagnosis Date Noted    Sickle cell crisis ([facility] 08/19/2019    Pneumonia of both lungs due to Mycoplasma pneumoniae     Acute respiratory failure with hypoxia (HCC)     On mechanically assisted ventilation (HCC)     Pancytopenia (HCC)     ARDS (adult respiratory distress syndrome) (HCC)     Pneumonia of both lungs due to infectious organism     Thrombocytopenia (HCC)     Diffuse pulmonary alveolar hemorrhage     Massive hemoptysis     Beta thalassemia ([facility] 08/18/2019         PLAN:      WEAN PER PROTOCOL:  []   No  [x]   Yes []   N/A                         ICU PROPHYLAXIS:                Stress ulcer:  []   PPI Agent []   [id number] []   Sucralfate []   Other [x]   None      VTE:  []   Enoxaparin []   SQ Heparin [x]   EPC Cuffs []  Other                       NUTRITION:   [x]  NPO []   TF:  []   TPN []   PO                       HOME MEDS RECONCILED:  []   No  [x]   Yes                           CONSULTATION NEEDED:  []   No  [x]   Yes                           FAMILY UPDATED:  []   No  [x]   Yes                           TRANSFER OUT OF ICU:  [x]   No  []   Yes             PLAN/MEDICAL DECISION MAKING:  Neurologic: Intubated, sedated. · Neuro intact  · Neuro checks per protocol  · propofol and fentanyl  Nimbex - wean off today   Cardiovascular: mild troponin elevation, stable. Hx of hypertension. Received bolus overnight for borderline BPs  · Hemodynamically stable  · MAP goal 65  · Art line in place  · Labetalol/hydralazine PRN for BP     Pulmonary: Intubated, sedated, diffuse alveolar hemorrhage on bronch on 8/19. Bloody secretions during intubation.    · Maintain oxygen sats >92%  · Pulmonary toilet  · Vent Information  · $Ventilation: $Subsequent Day  · Ventilator Started: (S) Yes  · Skin Assessment: Clean, dry, & intact  · Equipment Changed: HME  · Vent Type: Servo i  · Vent Mode: PRVC  · Vt Ordered: 470 mL  · Pressure

## 2019-08-20 NOTE — PROGRESS NOTES
Diffuse right greater than left   heterogeneous opacities.  Normal lung volume.  No pleural effusion or   pneumothorax.  No cardiomegaly.  Enlarged main pulmonary artery.  No acute   osseous abnormality.           Impression   Diffuse right greater than left heterogeneous opacities.  Differential   considerations include pulmonary edema, ARDS and pneumonia. EXAMINATION:   ONE XRAY VIEW OF THE CHEST       8/20/2019 9:50 am       COMPARISON:   08/19/2019       HISTORY:   ORDERING SYSTEM PROVIDED HISTORY: intubated   TECHNOLOGIST PROVIDED HISTORY:   intubated       FINDINGS:   The endotracheal tube measures 3.2 cm from the penelope.  Cardiac silhouette   and mediastinal structures appear stable.  Diffuse increased interstitial and   alveolar opacities are identified bilaterally with bilateral pleural   effusions, with mild worsening from the previous examination.  Osseous   structures appear stable.           Impression   Worsening interstitial and alveolar opacities with bilateral pleural   effusions. Medical Decision [id number]:       Medical Decision Making-Other:     Note:  · Labs, medications, radiologic studies were reviewed with personal review of films  · Moderate Large amounts of data were reviewed  · Discussed with nursing Staff, Discharge planner  · Infection Control and Prevention measures reviewed  · All prior entries were reviewed  · Administer medications as ordered  · Prognosis: Guarded  · Discharge planning reviewed  · Follow up as outpatient. Thank you for allowing us to participate in the care of this patient. Please call with questions. Wan Otero DPM PGY1  Seen with Dr. Rodriguez Sheridan:    I have discussed the case, including pertinent history and exam findings with the residents. I have seen and examined the patient and the key elements of the encounter have been performed by me.  I have reviewed the laboratory data, other diagnostic studies and discussed

## 2019-08-20 NOTE — PROCEDURES
PROCEDURE NOTE - ARTERIAL LINE PLACEMENT    PATIENT NAME: [address] [id number]  DATE: 8/19/2019  ATTENDING PHYSICIAN:  Chase Arzola      PREOPERATIVE DIAGNOSIS:  Need for blood pressure monitoring  POSTOPERATIVE DIAGNOSIS:  Same  PROCEDURE PERFORMED: Left Radial Arterial Line Insertion  PERFORMING PHYSICIAN:  Anita Hines MD  ESTIMATED BLOOD LOSS:  Less than 25 ml  COMPLICATIONS:  None immediately appreciated. DISCUSSION:  Lyle Christopher is a 29 y.o. female who requires invasive pressure monitoring. The history and physical examination were reviewed and confirmed. CONSENT: Unable to be obtained due to patient's condition. PROCEDURE:  A timeout was initiated by the bedside nurse and was confirmed by those present. The patient was placed in a supine position. The skin overlying the Left Radial was prepped with chlorhexadine. Through this region, the introducer needle through catheter was inserted into left radial artery until pulsatile bright blood was seen in collection tubing. Guidewire was advanced with no resistance. Catheter was advanced into the artery, wire was pulled with brisk bleeding noted. Pressure monitoring setup was connected to the catheter, it aspirated and flushed easily. The catheter was secured to the wrist with 3-0 silk and covered with a CHG tegaderm. No immediate complication was evident. All sponge, instrument and needle counts were correct at the completion of the procedure.       Anita Hines MD  4:49 PM, 8/19/19

## 2019-08-20 NOTE — CONSULTS
respiratory failure with hypoxia ([facility] [J96.01]     On mechanically assisted ventilation ([address] [Z99.11]     Pancytopenia ([address] [D61.818]     ARDS (adult respiratory distress syndrome) (HCC) [J80]     Pneumonia of both lungs due to infectious organism [J18.9]     Thrombocytopenia (HCC) [D69.6]     Diffuse pulmonary alveolar hemorrhage [R04.2]     Massive hemoptysis [R04.2]     Beta thalassemia ([facility] [D56.1] 08/18/2019       RECOMMENDATIONS:  1. 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 2. Parvo B19 infection is the most well-known cause of this but other causes especially mycoplasma have been described. Parvovirus antibodies are pending. Patient has positive mycoplasma titers. Currently on levofloxacin. 3. I recommend supportive care and transfusion since the patient is actively bleeding in her lungs. S/p bronchoscopy with no active bleeding points. Received 2 units of PRBC and 2 units of platelets yesterday. Hemoglobin and platelets stable for now. 4. Transfuse to keep the platelets above 11,938 and hemoglobin above 7.  5. The possibility of pancytopenia secondary to sepsis is unlikely but not completely excluded  6. DIC is quite unlikely considering elevated fibrinogen and normal PT and PTT. Discussed with ICU team  and Nurse.     Tommy Murray MD      Department of Internal Medicine  [facility]

## 2019-08-20 NOTE — PROGRESS NOTES
Data:  I/O this shift: In: 470 [I.V.:470]  Out: -   In: 3547.1 [I.V.:3547.1]  Out: 997 [Urine:897]  CBC:   Recent Labs     08/18/19  2321 08/19/19  0359  08/19/19  1552 08/19/19  1845 08/20/19  0400   WBC 12.0* 8.3  --   --   --  9.1   HGB 5.5* 7.2*   < > 6.2* 7.1* 7.7*   PLT See Reflexed IPF Result See Reflexed IPF Result  --  89*  --  See Reflexed IPF Result    < > = values in this interval not displayed. BMP:    Recent Labs     08/18/19  2321 08/19/19  0359 08/19/19  0628 08/20/19  0400    148*  --  145*   K 3.1* 3.6*  --  3.4*    111*  --  110*   CO2 23 20  --  25   BUN 11 10  --  7   CREATININE 0.53 0.55 0.68 0.32*   GLUCOSE 116* 118*  --  124*     Hepatic:   Recent Labs     08/19/19  0359 08/20/19  0400   AST 55* 27   ALT 22 16   BILITOT 2.15* 2.63*   ALKPHOS 88 67     INR:   Recent Labs     08/19/19  1336 08/20/19  0911   INR 1.0 1.0         Plan       1. Patient has bone suppression most likely from mycoplasma. 2. 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 3. Parvovirus antibodies are pending. Patient has positive mycoplasma titers. Currently on levofloxacin. 4. I recommend supportive care and transfusion since the patient is actively bleeding in her lungs. S/p bronchoscopy with no active bleeding points. Received 2 units of PRBC and 2 units of platelets yesterday. Hemoglobin and platelets stable for now. 5. Transfuse to keep the platelets above 44,052 and hemoglobin above 7.  6. The possibility of pancytopenia secondary to sepsis is unlikely but not completely excluded  7.  DIC is quite unlikely considering elevated fibrinogen and normal PT and PTT      Tommy Murray MD      Department of Internal Medicine  [facility]               This note is created with the assistance of a speech recognition program.  While intending to generate a document that actually reflects the content of the visit, the document can still have some errors including those of syntax and sound a like substitutions which may escape proof reading. It such instances, actual meaning can be extrapolated by contextual diversion.

## 2019-08-20 NOTE — CONSULTS
Rheumatology Inpatient Consult Note          Patient: Analilia León / 1985 (29 y.o.)  MRN: [id number]        Reason for Consult:  MIKE and Ds DNA +ve  Requesting Physician:  Dr Aleksander Choe  Primary Care Physician: No primary care provider on file. CHIEF COMPLAINT:    Chief Complaint   Patient presents with    Fatigue     Tx from [facility]. Not feeling well since Thursday.  Shortness of Breath     on 4L O2       History Obtained From:  patient, care giver    HISTORY OF PRESENT ILLNESS:                The patient is a 29 y.o. female with significant past medical history of sickle cell disease, beta thalassemia trait who presents with nonproductive cough, chills, generalized weakness  She lives in [place] for the past 2 years went to [place] for about a week for On 8/14/2019 and started experiencing chills, generalized pains worse in her legs. On Thursday she was taken to the emergency department was found to be anemic. Given IV fluids and Vicodin but then returned to [place]. On 8/18/2019 she left And was on bus back to [place] when she developed a nonproductive cough, generalized weakness and overall malaise. A member of the group called the EMS and she was taken to [facility].  She was given 3 L bolus, IV antibiotics. Her hemoglobin was 6.8 [did not receive any blood]. Based on report chest x-ray showed bilateral opacities. She denies any sore throat, chest pain, palpitations, productive cough, dysuria, melena, vomiting or sick contacts, blood in the urine at the time of arrival.  On arrival to [facility] she was placed on 5 L nonrebreather mask. O2 saturation was in the 90s, she was switched to BiPAP as she was very tachypneic. She received 2 L normal saline bolus, vancomycin and Zosyn. Hemoglobin came back as 5.5 she did receive a total of 2 units of PRBCs. Low platelets received 2 units of platelets so far.     She is originally from [place] where she was diagnosed with sickle cell

## 2019-08-21 ENCOUNTER — APPOINTMENT (OUTPATIENT)
Dept: GENERAL RADIOLOGY | Age: 34
DRG: 207 | End: 2019-08-21
Payer: COMMERCIAL

## 2019-08-21 LAB
-: ABNORMAL
ABSOLUTE EOS #: 0 K/UL (ref 0–0.4)
ABSOLUTE EOS #: 0 K/UL (ref 0–0.4)
ABSOLUTE IMMATURE GRANULOCYTE: 0.15 K/UL (ref 0–0.3)
ABSOLUTE IMMATURE GRANULOCYTE: 0.29 K/UL (ref 0–0.3)
ABSOLUTE LYMPH #: 2.24 K/UL (ref 1–4.8)
ABSOLUTE LYMPH #: 3 K/UL (ref 1–4.8)
ABSOLUTE MONO #: 0.14 K/UL (ref 0.1–0.8)
ABSOLUTE MONO #: 0.45 K/UL (ref 0.1–0.8)
ABSOLUTE RETIC #: 0.05 M/UL (ref 0.03–0.08)
ABSOLUTE RETIC #: 0.05 M/UL (ref 0.03–0.08)
ALBUMIN SERPL-MCNC: 2.4 G/DL (ref 3.5–5.2)
ALBUMIN/GLOBULIN RATIO: 0.8 (ref 1–2.5)
ALLEN TEST: ABNORMAL
ALP BLD-CCNC: 151 U/L (ref 35–104)
ALT SERPL-CCNC: 25 U/L (ref 5–33)
AMORPHOUS: ABNORMAL
AMPHETAMINE SCREEN URINE: NEGATIVE
ANION GAP SERPL CALCULATED.3IONS-SCNC: 10 MMOL/L (ref 9–17)
ANION GAP SERPL CALCULATED.3IONS-SCNC: 12 MMOL/L (ref 9–17)
ANION GAP SERPL CALCULATED.3IONS-SCNC: 15 MMOL/L (ref 9–17)
AST SERPL-CCNC: 47 U/L
BACTERIA: ABNORMAL
BARBITURATE SCREEN URINE: NEGATIVE
BASOPHILS # BLD: 0 % (ref 0–2)
BASOPHILS # BLD: 0 % (ref 0–2)
BASOPHILS ABSOLUTE: 0 K/UL (ref 0–0.2)
BASOPHILS ABSOLUTE: 0 K/UL (ref 0–0.2)
BENZODIAZEPINE SCREEN, URINE: NEGATIVE
BILIRUB SERPL-MCNC: 4.6 MG/DL (ref 0.3–1.2)
BILIRUBIN DIRECT: 3.29 MG/DL
BILIRUBIN URINE: ABNORMAL
BILIRUBIN, INDIRECT: 1.31 MG/DL (ref 0–1)
BUN BLDV-MCNC: 11 MG/DL (ref 6–20)
BUN BLDV-MCNC: 14 MG/DL (ref 6–20)
BUN BLDV-MCNC: 9 MG/DL (ref 6–20)
BUN/CREAT BLD: ABNORMAL (ref 9–20)
BUPRENORPHINE URINE: NORMAL
CALCIUM SERPL-MCNC: 7.8 MG/DL (ref 8.6–10.4)
CALCIUM SERPL-MCNC: 7.9 MG/DL (ref 8.6–10.4)
CALCIUM SERPL-MCNC: 8 MG/DL (ref 8.6–10.4)
CANNABINOID SCREEN URINE: NEGATIVE
CASE NUMBER:: NORMAL
CASTS UA: ABNORMAL /LPF (ref 0–8)
CHLORIDE BLD-SCNC: 104 MMOL/L (ref 98–107)
CHLORIDE BLD-SCNC: 105 MMOL/L (ref 98–107)
CHLORIDE BLD-SCNC: 107 MMOL/L (ref 98–107)
CO2: 21 MMOL/L (ref 20–31)
CO2: 21 MMOL/L (ref 20–31)
CO2: 23 MMOL/L (ref 20–31)
COCAINE METABOLITE, URINE: NEGATIVE
COLOR: ABNORMAL
CORTISOL COLLECTION INFO: NORMAL
CORTISOL: 16 UG/DL (ref 2.7–18.4)
CREAT SERPL-MCNC: 0.39 MG/DL (ref 0.5–0.9)
CREAT SERPL-MCNC: 0.45 MG/DL (ref 0.5–0.9)
CREAT SERPL-MCNC: 0.48 MG/DL (ref 0.5–0.9)
CRYSTALS, UA: ABNORMAL /HPF
CULTURE: ABNORMAL
DIFFERENTIAL TYPE: ABNORMAL
DIFFERENTIAL TYPE: ABNORMAL
DIRECT EXAM: ABNORMAL
EOSINOPHILS RELATIVE PERCENT: 0 % (ref 1–4)
EOSINOPHILS RELATIVE PERCENT: 0 % (ref 1–4)
EPITHELIAL CELLS UA: ABNORMAL /HPF (ref 0–5)
FIO2: 50
GFR AFRICAN AMERICAN: >60 ML/MIN
GFR NON-AFRICAN AMERICAN: >60 ML/MIN
GFR SERPL CREATININE-BSD FRML MDRD: ABNORMAL ML/MIN/{1.73_M2}
GLOBULIN: ABNORMAL G/DL (ref 1.5–3.8)
GLUCOSE BLD-MCNC: 127 MG/DL (ref 70–99)
GLUCOSE BLD-MCNC: 160 MG/DL (ref 70–99)
GLUCOSE BLD-MCNC: 166 MG/DL (ref 70–99)
GLUCOSE URINE: NEGATIVE
HCT VFR BLD CALC: 16.8 % (ref 36.3–47.1)
HCT VFR BLD CALC: 24 % (ref 36.3–47.1)
HCT VFR BLD CALC: 25.6 % (ref 36.3–47.1)
HEMOGLOBIN: 5.4 G/DL (ref 11.9–15.1)
HEMOGLOBIN: 8.2 G/DL (ref 11.9–15.1)
HEMOGLOBIN: 8.4 G/DL (ref 11.9–15.1)
HGB ELECTROPHORESIS INTERP: NORMAL
IMMATURE GRANULOCYTES: 1 %
IMMATURE GRANULOCYTES: 2 %
IMMATURE RETIC FRACT: 34.2 % (ref 2.7–18.3)
IMMATURE RETIC FRACT: 34.5 % (ref 2.7–18.3)
KETONES, URINE: NEGATIVE
LACTATE DEHYDROGENASE: 823 U/L (ref 135–214)
LACTIC ACID, WHOLE BLOOD: 2 MMOL/L (ref 0.7–2.1)
LEUKOCYTE ESTERASE, URINE: NEGATIVE
LV EF: 50 %
LVEF MODALITY: NORMAL
LYMPHOCYTES # BLD: 15 % (ref 24–44)
LYMPHOCYTES # BLD: 21 % (ref 24–44)
Lab: ABNORMAL
MCH RBC QN AUTO: 25.4 PG (ref 25.2–33.5)
MCH RBC QN AUTO: 26.1 PG (ref 25.2–33.5)
MCHC RBC AUTO-ENTMCNC: 32.1 G/DL (ref 28.4–34.8)
MCHC RBC AUTO-ENTMCNC: 32.8 G/DL (ref 28.4–34.8)
MCV RBC AUTO: 78.9 FL (ref 82.6–102.9)
MCV RBC AUTO: 79.5 FL (ref 82.6–102.9)
MDMA URINE: NORMAL
METHADONE SCREEN, URINE: NEGATIVE
METHAMPHETAMINE, URINE: NORMAL
MODE: ABNORMAL
MONOCYTES # BLD: 1 % (ref 1–7)
MONOCYTES # BLD: 3 % (ref 1–7)
MORPHOLOGY: ABNORMAL
MUCUS: ABNORMAL
NEGATIVE BASE EXCESS, ART: ABNORMAL (ref 0–2)
NITRITE, URINE: POSITIVE
NRBC AUTOMATED: 181 PER 100 WBC
NRBC AUTOMATED: 66.9 PER 100 WBC
NUCLEATED RED BLOOD CELLS: 181 PER 100 WBC
NUCLEATED RED BLOOD CELLS: 65 PER 100 WBC
O2 DEVICE/FLOW/%: ABNORMAL
OPIATES, URINE: NEGATIVE
OTHER OBSERVATIONS UA: ABNORMAL
OXYCODONE SCREEN URINE: NEGATIVE
PATHOLOGIST REVIEW: NORMAL
PATHOLOGIST: NORMAL
PATIENT TEMP: 38.4
PDW BLD-RTO: 16.5 % (ref 11.8–14.4)
PDW BLD-RTO: 17.4 % (ref 11.8–14.4)
PH UA: 6 (ref 5–8)
PHENCYCLIDINE, URINE: NEGATIVE
PLATELET # BLD: ABNORMAL K/UL (ref 138–453)
PLATELET # BLD: ABNORMAL K/UL (ref 138–453)
PLATELET ESTIMATE: ABNORMAL
PLATELET ESTIMATE: ABNORMAL
PLATELET, FLUORESCENCE: 51 K/UL (ref 138–453)
PLATELET, FLUORESCENCE: 61 K/UL (ref 138–453)
PLATELET, IMMATURE FRACTION: 11.1 % (ref 1.1–10.3)
PLATELET, IMMATURE FRACTION: 12.6 % (ref 1.1–10.3)
PMV BLD AUTO: ABNORMAL FL (ref 8.1–13.5)
PMV BLD AUTO: ABNORMAL FL (ref 8.1–13.5)
POC HCO3: 24.7 MMOL/L (ref 21–28)
POC O2 SATURATION: 92 % (ref 94–98)
POC PCO2 TEMP: 42 MM HG
POC PCO2: 39.4 MM HG (ref 35–48)
POC PH TEMP: 7.39
POC PH: 7.41 (ref 7.35–7.45)
POC PO2 TEMP: 71 MM HG
POC PO2: 64.1 MM HG (ref 83–108)
POSITIVE BASE EXCESS, ART: 0 (ref 0–3)
POTASSIUM SERPL-SCNC: 4 MMOL/L (ref 3.7–5.3)
POTASSIUM SERPL-SCNC: 4.2 MMOL/L (ref 3.7–5.3)
POTASSIUM SERPL-SCNC: 4.3 MMOL/L (ref 3.7–5.3)
PROPOXYPHENE, URINE: NORMAL
PROTEIN UA: ABNORMAL
RBC # BLD: 2.13 M/UL (ref 3.95–5.11)
RBC # BLD: 3.22 M/UL (ref 3.95–5.11)
RBC # BLD: ABNORMAL 10*6/UL
RBC # BLD: ABNORMAL 10*6/UL
RBC UA: ABNORMAL /HPF (ref 0–4)
RENAL EPITHELIAL, UA: ABNORMAL /HPF
RETIC %: 1.6 % (ref 0.5–1.9)
RETIC %: 1.7 % (ref 0.5–1.9)
RETIC HEMOGLOBIN: 23.1 PG (ref 28.2–35.7)
RETIC HEMOGLOBIN: 26.7 PG (ref 28.2–35.7)
SAMPLE SITE: ABNORMAL
SEG NEUTROPHILS: 76 % (ref 36–66)
SEG NEUTROPHILS: 81 % (ref 36–66)
SEGMENTED NEUTROPHILS ABSOLUTE COUNT: 10.87 K/UL (ref 1.8–7.7)
SEGMENTED NEUTROPHILS ABSOLUTE COUNT: 12.06 K/UL (ref 1.8–7.7)
SODIUM BLD-SCNC: 138 MMOL/L (ref 135–144)
SODIUM BLD-SCNC: 140 MMOL/L (ref 135–144)
SODIUM BLD-SCNC: 140 MMOL/L (ref 135–144)
SPECIFIC GRAVITY UA: 1.02 (ref 1–1.03)
SPECIMEN DESCRIPTION: ABNORMAL
SPECIMEN DESCRIPTION: NORMAL
TCO2 (CALC), ART: 26 MMOL/L (ref 22–29)
TEST INFORMATION: NORMAL
TOTAL PROTEIN: 5.6 G/DL (ref 6.4–8.3)
TRICHOMONAS: ABNORMAL
TRICYCLIC ANTIDEPRESSANTS, UR: NORMAL
TRIGL SERPL-MCNC: 202 MG/DL
TROPONIN INTERP: ABNORMAL
TROPONIN INTERP: ABNORMAL
TROPONIN T: ABNORMAL NG/ML
TROPONIN T: ABNORMAL NG/ML
TROPONIN, HIGH SENSITIVITY: 67 NG/L (ref 0–14)
TROPONIN, HIGH SENSITIVITY: 87 NG/L (ref 0–14)
TURBIDITY: CLEAR
URINE HGB: NEGATIVE
UROBILINOGEN, URINE: NORMAL
WBC # BLD: 14.3 K/UL (ref 3.5–11.3)
WBC # BLD: 14.9 K/UL (ref 3.5–11.3)
WBC # BLD: ABNORMAL 10*3/UL
WBC # BLD: ABNORMAL 10*3/UL
WBC UA: ABNORMAL /HPF (ref 0–5)
YEAST: ABNORMAL

## 2019-08-21 PROCEDURE — 6360000002 HC RX W HCPCS: Performed by: STUDENT IN AN ORGANIZED HEALTH CARE EDUCATION/TRAINING PROGRAM

## 2019-08-21 PROCEDURE — 2500000003 HC RX 250 WO HCPCS: Performed by: STUDENT IN AN ORGANIZED HEALTH CARE EDUCATION/TRAINING PROGRAM

## 2019-08-21 PROCEDURE — 85025 COMPLETE CBC W/AUTO DIFF WBC: CPT

## 2019-08-21 PROCEDURE — 6360000002 HC RX W HCPCS

## 2019-08-21 PROCEDURE — 93306 TTE W/DOPPLER COMPLETE: CPT

## 2019-08-21 PROCEDURE — 99232 SBSQ HOSP IP/OBS MODERATE 35: CPT | Performed by: INTERNAL MEDICINE

## 2019-08-21 PROCEDURE — 83516 IMMUNOASSAY NONANTIBODY: CPT

## 2019-08-21 PROCEDURE — 2580000003 HC RX 258: Performed by: STUDENT IN AN ORGANIZED HEALTH CARE EDUCATION/TRAINING PROGRAM

## 2019-08-21 PROCEDURE — P9016 RBC LEUKOCYTES REDUCED: HCPCS

## 2019-08-21 PROCEDURE — 2000000000 HC ICU R&B

## 2019-08-21 PROCEDURE — 2700000000 HC OXYGEN THERAPY PER DAY

## 2019-08-21 PROCEDURE — 36556 INSERT NON-TUNNEL CV CATH: CPT

## 2019-08-21 PROCEDURE — 03HC33Z INSERTION OF INFUSION DEVICE INTO LEFT RADIAL ARTERY, PERCUTANEOUS APPROACH: ICD-10-PCS | Performed by: INTERNAL MEDICINE

## 2019-08-21 PROCEDURE — 82533 TOTAL CORTISOL: CPT

## 2019-08-21 PROCEDURE — 86039 ANTINUCLEAR ANTIBODIES (ANA): CPT

## 2019-08-21 PROCEDURE — 84484 ASSAY OF TROPONIN QUANT: CPT

## 2019-08-21 PROCEDURE — 81001 URINALYSIS AUTO W/SCOPE: CPT

## 2019-08-21 PROCEDURE — 6370000000 HC RX 637 (ALT 250 FOR IP): Performed by: STUDENT IN AN ORGANIZED HEALTH CARE EDUCATION/TRAINING PROGRAM

## 2019-08-21 PROCEDURE — 85055 RETICULATED PLATELET ASSAY: CPT

## 2019-08-21 PROCEDURE — 84478 ASSAY OF TRIGLYCERIDES: CPT

## 2019-08-21 PROCEDURE — 82803 BLOOD GASES ANY COMBINATION: CPT

## 2019-08-21 PROCEDURE — 36415 COLL VENOUS BLD VENIPUNCTURE: CPT

## 2019-08-21 PROCEDURE — 85045 AUTOMATED RETICULOCYTE COUNT: CPT

## 2019-08-21 PROCEDURE — 80076 HEPATIC FUNCTION PANEL: CPT

## 2019-08-21 PROCEDURE — 94770 HC ETCO2 MONITOR DAILY: CPT

## 2019-08-21 PROCEDURE — 83615 LACTATE (LD) (LDH) ENZYME: CPT

## 2019-08-21 PROCEDURE — 71045 X-RAY EXAM CHEST 1 VIEW: CPT

## 2019-08-21 PROCEDURE — 99233 SBSQ HOSP IP/OBS HIGH 50: CPT | Performed by: INTERNAL MEDICINE

## 2019-08-21 PROCEDURE — 6360000002 HC RX W HCPCS: Performed by: INTERNAL MEDICINE

## 2019-08-21 PROCEDURE — 80307 DRUG TEST PRSMV CHEM ANLYZR: CPT

## 2019-08-21 PROCEDURE — 83605 ASSAY OF LACTIC ACID: CPT

## 2019-08-21 PROCEDURE — 94640 AIRWAY INHALATION TREATMENT: CPT

## 2019-08-21 PROCEDURE — 80048 BASIC METABOLIC PNL TOTAL CA: CPT

## 2019-08-21 PROCEDURE — 86900 BLOOD TYPING SEROLOGIC ABO: CPT

## 2019-08-21 PROCEDURE — 85018 HEMOGLOBIN: CPT

## 2019-08-21 PROCEDURE — 37799 UNLISTED PX VASCULAR SURGERY: CPT

## 2019-08-21 PROCEDURE — 99291 CRITICAL CARE FIRST HOUR: CPT | Performed by: INTERNAL MEDICINE

## 2019-08-21 PROCEDURE — 85014 HEMATOCRIT: CPT

## 2019-08-21 PROCEDURE — 94761 N-INVAS EAR/PLS OXIMETRY MLT: CPT

## 2019-08-21 PROCEDURE — 36430 TRANSFUSION BLD/BLD COMPNT: CPT

## 2019-08-21 PROCEDURE — 94003 VENT MGMT INPAT SUBQ DAY: CPT

## 2019-08-21 RX ORDER — SODIUM CHLORIDE, SODIUM LACTATE, POTASSIUM CHLORIDE, AND CALCIUM CHLORIDE .6; .31; .03; .02 G/100ML; G/100ML; G/100ML; G/100ML
500 INJECTION, SOLUTION INTRAVENOUS ONCE
Status: COMPLETED | OUTPATIENT
Start: 2019-08-21 | End: 2019-08-21

## 2019-08-21 RX ORDER — ALBUTEROL SULFATE 2.5 MG/3ML
2.5 SOLUTION RESPIRATORY (INHALATION) EVERY 4 HOURS PRN
Status: DISCONTINUED | OUTPATIENT
Start: 2019-08-21 | End: 2019-08-23

## 2019-08-21 RX ORDER — 0.9 % SODIUM CHLORIDE 0.9 %
250 INTRAVENOUS SOLUTION INTRAVENOUS ONCE
Status: COMPLETED | OUTPATIENT
Start: 2019-08-21 | End: 2019-08-21

## 2019-08-21 RX ORDER — LEVALBUTEROL INHALATION SOLUTION 1.25 MG/3ML
1.25 SOLUTION RESPIRATORY (INHALATION) EVERY 4 HOURS
Status: DISCONTINUED | OUTPATIENT
Start: 2019-08-21 | End: 2019-08-23

## 2019-08-21 RX ORDER — ALBUTEROL SULFATE 2.5 MG/3ML
2.5 SOLUTION RESPIRATORY (INHALATION) EVERY 4 HOURS
Status: DISCONTINUED | OUTPATIENT
Start: 2019-08-21 | End: 2019-08-21

## 2019-08-21 RX ORDER — FAMOTIDINE 20 MG/1
20 TABLET, FILM COATED ORAL 2 TIMES DAILY
Status: DISCONTINUED | OUTPATIENT
Start: 2019-08-21 | End: 2019-08-28 | Stop reason: HOSPADM

## 2019-08-21 RX ORDER — SODIUM CHLORIDE, SODIUM LACTATE, POTASSIUM CHLORIDE, AND CALCIUM CHLORIDE .6; .31; .03; .02 G/100ML; G/100ML; G/100ML; G/100ML
1000 INJECTION, SOLUTION INTRAVENOUS ONCE
Status: COMPLETED | OUTPATIENT
Start: 2019-08-21 | End: 2019-08-21

## 2019-08-21 RX ADMIN — ACETAMINOPHEN 650 MG: 325 TABLET ORAL at 00:20

## 2019-08-21 RX ADMIN — LEVALBUTEROL HYDROCHLORIDE 1.25 MG: 1.25 SOLUTION RESPIRATORY (INHALATION) at 11:41

## 2019-08-21 RX ADMIN — SODIUM CHLORIDE 250 ML: 9 INJECTION, SOLUTION INTRAVENOUS at 03:53

## 2019-08-21 RX ADMIN — LEVALBUTEROL HYDROCHLORIDE 1.25 MG: 1.25 SOLUTION RESPIRATORY (INHALATION) at 20:22

## 2019-08-21 RX ADMIN — SODIUM CHLORIDE, POTASSIUM CHLORIDE, SODIUM LACTATE AND CALCIUM CHLORIDE 500 ML: 600; 310; 30; 20 INJECTION, SOLUTION INTRAVENOUS at 06:35

## 2019-08-21 RX ADMIN — LEVALBUTEROL HYDROCHLORIDE 1.25 MG: 1.25 SOLUTION RESPIRATORY (INHALATION) at 03:32

## 2019-08-21 RX ADMIN — SODIUM CHLORIDE: 4.5 INJECTION, SOLUTION INTRAVENOUS at 03:13

## 2019-08-21 RX ADMIN — Medication 75 MCG/HR: at 15:29

## 2019-08-21 RX ADMIN — FOLIC ACID 1 MG: 5 INJECTION, SOLUTION INTRAMUSCULAR; INTRAVENOUS; SUBCUTANEOUS at 07:36

## 2019-08-21 RX ADMIN — FAMOTIDINE 20 MG: 20 TABLET, FILM COATED ORAL at 20:21

## 2019-08-21 RX ADMIN — SODIUM CHLORIDE, PRESERVATIVE FREE 10 ML: 5 INJECTION INTRAVENOUS at 07:36

## 2019-08-21 RX ADMIN — Medication 2 MG/HR: at 10:37

## 2019-08-21 RX ADMIN — Medication 75 MCG/HR: at 03:14

## 2019-08-21 RX ADMIN — LEVALBUTEROL HYDROCHLORIDE 1.25 MG: 1.25 SOLUTION RESPIRATORY (INHALATION) at 16:10

## 2019-08-21 RX ADMIN — ACETAMINOPHEN 650 MG: 325 TABLET ORAL at 05:15

## 2019-08-21 RX ADMIN — ACETAMINOPHEN 650 MG: 325 TABLET ORAL at 20:21

## 2019-08-21 RX ADMIN — LEVALBUTEROL HYDROCHLORIDE 1.25 MG: 1.25 SOLUTION RESPIRATORY (INHALATION) at 07:54

## 2019-08-21 RX ADMIN — SODIUM CHLORIDE, POTASSIUM CHLORIDE, SODIUM LACTATE AND CALCIUM CHLORIDE 1000 ML: 600; 310; 30; 20 INJECTION, SOLUTION INTRAVENOUS at 01:32

## 2019-08-21 RX ADMIN — SODIUM CHLORIDE, POTASSIUM CHLORIDE, SODIUM LACTATE AND CALCIUM CHLORIDE 500 ML: 600; 310; 30; 20 INJECTION, SOLUTION INTRAVENOUS at 01:03

## 2019-08-21 RX ADMIN — SODIUM CHLORIDE: 4.5 INJECTION, SOLUTION INTRAVENOUS at 02:41

## 2019-08-21 RX ADMIN — SODIUM CHLORIDE, PRESERVATIVE FREE 10 ML: 5 INJECTION INTRAVENOUS at 20:21

## 2019-08-21 RX ADMIN — ACETAMINOPHEN 650 MG: 325 TABLET ORAL at 09:09

## 2019-08-21 RX ADMIN — PROPOFOL 35 MCG/KG/MIN: 10 INJECTION, EMULSION INTRAVENOUS at 07:26

## 2019-08-21 RX ADMIN — PROPOFOL 35 MCG/KG/MIN: 10 INJECTION, EMULSION INTRAVENOUS at 00:13

## 2019-08-21 RX ADMIN — ACETAMINOPHEN 650 MG: 325 TABLET ORAL at 14:35

## 2019-08-21 RX ADMIN — LEVOFLOXACIN 750 MG: 5 INJECTION, SOLUTION INTRAVENOUS at 07:36

## 2019-08-21 RX ADMIN — VASOPRESSIN 0.04 UNITS/MIN: 20 INJECTION INTRAVENOUS at 01:26

## 2019-08-21 RX ADMIN — LEVALBUTEROL HYDROCHLORIDE 1.25 MG: 1.25 SOLUTION RESPIRATORY (INHALATION) at 23:23

## 2019-08-21 ASSESSMENT — PULMONARY FUNCTION TESTS
PIF_VALUE: 28
PIF_VALUE: 28
PIF_VALUE: 24
PIF_VALUE: 29
PIF_VALUE: 27
PIF_VALUE: 31
PIF_VALUE: 28

## 2019-08-21 NOTE — PROCEDURES
PROCEDURE NOTE - CENTRAL VENOUS LINE PLACEMENT    PATIENT NAME: [address]abella [address] [id number]  DATE: 8/21/2019  ATTENDING PHYSICIAN: CAROL    PREOPERATIVE DIAGNOSIS:  centrally administered medications  POSTOPERATIVE DIAGNOSIS:  Same  PROCEDURE PERFORMED:  Right Femoral Vein Central Line Insertion  PERFORMING PHYSICIAN: Lidia Talley DO  ANESTHESIA:  Local utilizing 1% lidocaine  ESTIMATED BLOOD LOSS:  Less than 25 ml  COMPLICATIONS:  None immediately appreciated. DISCUSSION:  Barbara Lundberg is a 29y.o.-year-old female who requires central IV access centrally administered medications. The history and physical examination were reviewed and confirmed. CONSENT: Unable to be obtained due to patient's condition. PROCEDURE:  A timeout was initiated by the bedside nurse and was confirmed by those present. The patient was placed in a supine position. The skin overlying the Right Femoral Vein was prepped with chlorhexadine and draped in sterile fashion. The skin was infiltrated with local anesthetic. The vessel and surrounding anatomy was visualized using ultrasound. Through the anesthetized region, the introducer needle was inserted into the femoral vein returning dark red non pulsatile blood. A guidewire was placed through the center of the needle with no resistance. Ultrasound confirmed presence of wire in the vein. A small incision made in the skin with a #11 scalpel blade. The dilator was inserted into the skin and vein over guidewire using Seldinger technique. The dilator was then removed and thecatheter was placed in the vein over the guidewire using Seldinger technique. The guidewire was then removed and all ports aspirated and flushed appropriately. The catheter then secured using silk suture and a temporary sterile dressing was applied. No immediate complication was evident. All sponge, instrument and needle counts were correct at the completion of the procedure.   The patient

## 2019-08-21 NOTE — PLAN OF CARE
RN  Outcome: Ongoing  8/21/2019 0101 by Pete Goldberg RN  Outcome: Ongoing     Problem: SKIN INTEGRITY  Goal: Skin integrity is maintained or improved  8/21/2019 0817 by Farzaneh Polanco RN  Outcome: Ongoing  8/21/2019 0101 by Pete Goldberg RN  Outcome: Ongoing     Problem: Falls - Risk of:  Goal: Will remain free from falls  Description  Will remain free from falls  8/21/2019 0817 by Farzaneh Polanco RN  Outcome: Ongoing  8/21/2019 0101 by Pete Goldberg RN  Outcome: Ongoing  Goal: Absence of physical injury  Description  Absence of physical injury  8/21/2019 0817 by Farzaneh Polanco RN  Outcome: Ongoing  8/21/2019 0101 by Pete Goldberg RN  Outcome: Ongoing     Problem: Risk for Impaired Skin Integrity  Goal: Tissue integrity - skin and mucous membranes  Description  Structural intactness and normal physiological function of skin and  mucous membranes.   8/21/2019 0817 by Farzaneh Polanco RN  Outcome: Ongoing  8/21/2019 0101 by Pete Goldberg RN  Outcome: Ongoing

## 2019-08-21 NOTE — PROGRESS NOTES
 Number of children: Not on file    Years of education: Not on file    Highest education level: Not on file   Occupational History    Not on file   Social Needs    Financial resource strain: Not on file    Food insecurity:     Worry: Not on file     Inability: Not on file    Transportation needs:     Medical: Not on file     Non-medical: Not on file   Tobacco Use    Smoking status: Not on file   Substance and Sexual Activity    Alcohol use: Not on file    Drug use: Not on file    Sexual activity: Not on file   Lifestyle    Physical activity:     Days per week: Not on file     Minutes per session: Not on file    Stress: Not on file   Relationships    Social connections:     Talks on phone: Not on file     Gets together: Not on file     Attends Christian service: Not on file     Active member of club or organization: Not on file     Attends meetings of clubs or organizations: Not on file     Relationship status: Not on file    Intimate partner violence:     Fear of current or ex partner: Not on file     Emotionally abused: Not on file     Physically abused: Not on file     Forced sexual activity: Not on file   Other Topics Concern    Not on file   Social History Narrative    Not on file       Family History:   No family history on file. Allergies:   Patient has no known allergies. Review of Systems:   Constitutional: Currently on BiPAP, not responding very well, drowsy  Head: No headaches  Eyes: No double vision or blurry vision. No conjunctival inflammation. ENT: No sore throat or runny nose. . No hearing loss, tinnitus or vertigo. Cardiovascular: No chest pain or palpitations. No shortness of breath. No GONG  Lung: No shortness of breath or cough. No sputum production  Abdomen: No nausea, vomiting, diarrhea, or abdominal pain. Ledell Rolo No cramps. Genitourinary: No increased urinary frequency, or dysuria. No hematuria.  No suprapubic or CVA pain  Musculoskeletal: Weakness BUE and BLE  Hematologic: 100/59 101.3 °F (38.5 °C) CORE 120 28 100 % --   08/21/19 0315 (!) 95/58 -- -- 120 29 100 % --     General Appearance: on BiPAP, very drowsy and not able to follow commands or give history  Head:  Normocephalic, no trauma  Eyes: Pupils equal, round, reactive to light and accommodation; extraocular movements intact; sclera anicteric; conjunctivae pink. No embolic phenomena. ENT: Oropharynx clear, without erythema, exudate, or thrush. No tenderness of sinuses. Mouth/throat: mucosa pink and moist. No lesions. Dentition in good repair. Neck:Supple, without lymphadenopathy. Thyroid normal, No bruits. Pulmonary/Chest: Clear to auscultation, without wheezes, rales, or rhonchi. No dullness to percussion. Cardiovascular: Regular rate and rhythm without murmurs, rubs, or gallops. Abdomen: Soft, non tender. Bowel sounds normal. No organomegaly  All four Extremities: unable to lift legs. Can move toes BLE and weak hand squeeze BL. Neurologic: Unable to respond  Skin: Warm and dry with good turgor. No signs of peripheral arterial or venous insufficiency. No ulcerations. No open wounds. Medical Decision Making -Laboratory:   I have independently reviewed/ordered the following labs:    CBC with Differential:   Recent Labs     08/21/19  0149 08/21/19  0556   WBC 14.9* 14.3*   HGB 5.4* 8.4*   HCT 16.8* 25.6*   PLT See Reflexed IPF Result See Reflexed IPF Result   LYMPHOPCT 15* 21*   MONOPCT 3 1     BMP:   Recent Labs     08/18/19  2321  08/20/19  0400  08/21/19  0149 08/21/19  0556      < > 145*   < > 140 138   K 3.1*   < > 3.4*   < > 4.3 4.0      < > 110*   < > 104 105   CO2 23   < > 25   < > 21 21   BUN 11   < > 7   < > 11 14   CREATININE 0.53   < > 0.32*   < > 0.48* 0.39*   MG 1.9  --  1.8  --   --   --     < > = values in this interval not displayed.      Hepatic Function Panel:   Recent Labs     08/20/19  0400 08/21/19  0556   PROT 5.4* 5.6*   LABALBU 2.7* 2.4*   BILIDIR 1.10* 3.29*   IBILI 1.53* 1.31*

## 2019-08-21 NOTE — PROGRESS NOTES
negative                                                                                                                     [] pending)    VASOPRESSORS:  [] No    [x] Yes    If yes -   [x] Levophed       [] Dopamine     [] Vasopressin       [] Dobutamine  [] Phenylephrine         [] Epinephrine    CENTRAL LINES:     [] No   [x] Yes   (Date of Insertion:2019)           If yes -     [] Right IJ     [] Left IJ [x] Right Femoral [] Left Femoral                   [] Right Subclavian [] Left Subclavian       LEE'S CATHETER:   [] No   [x] Yes  (Date of Insertion: 2019)     URINE OUTPUT:            [x] Good   [] Low              [] Anuric      OBJECTIVE:     VITAL SIGNS:  /74   Pulse 123   Temp 100.2 °F (37.9 °C) (Core)   Resp 27   Ht 5' 6\" (1.676 m)   Wt 203 lb 7.8 oz (92.3 kg)   SpO2 99%   BMI 32.84 kg/m²   Tmax over 24 hours:  Temp (24hrs), Av.9 °F (38.3 °C), Min:98 °F (36.7 °C), Max:101.5 °F (38.6 °C)      Patient Vitals for the past 6 hrs:   BP Temp Temp src Pulse Resp SpO2 Weight   19 1215 -- -- -- 123 27 99 % --   19 1200 109/74 100.2 °F (37.9 °C) CORE 122 27 100 % --   19 1147 -- -- -- -- 29 99 % --   19 1145 -- -- -- 122 23 99 % --   19 1130 -- -- -- 117 26 100 % --   19 1115 -- -- -- 116 26 100 % --   19 1100 102/65 -- -- 116 26 100 % --   19 1045 -- -- -- 115 25 100 % --   19 1030 -- -- -- 115 29 100 % --   19 1015 -- -- -- 120 27 100 % --   19 1000 (!) 84/56 -- -- 121 25 100 % --   19 0945 -- -- -- 121 25 100 % --   19 -- -- -- 122 25 100 % --   19 -- -- -- 124 25 100 % --   19 (!) 92/56 -- -- 124 26 100 % --   19 -- -- -- 124 25 100 % --   19 -- -- -- 125 25 100 % --   19 -- -- -- 125 23 100 % --   19 (!) 95/57 98 °F (36.7 °C) CORE 122 23 100 % --   19 -- -- -- 120 23 100 % --   19 -- -- -- -- 23 100 % -- at 08/21/19 0313    propofol Stopped (08/21/19 1242)    fentaNYL 75 mcg/hr (08/21/19 0733)     PRN Meds:     albuterol 2.5 mg Q4H PRN   fentanNYL 25 mcg Q1H PRN   Or     fentanNYL 50 mcg Q1H PRN   sodium chloride flush 10 mL PRN   magnesium hydroxide 30 mL Daily PRN   ondansetron 4 mg Q6H PRN   potassium chloride 10 mEq PRN   magnesium sulfate 1 g PRN   acetaminophen 650 mg Q4H PRN   acetaminophen 650 mg Q4H PRN   metoprolol 5 mg Q6H PRN   propofol 20 mg PRN         VENT SETTINGS (Comprehensive) (if applicable):  Vent Information  $Ventilation: $Subsequent Day  Ventilator Started: (S) Yes  Skin Assessment: Clean, dry, & intact  Equipment Changed: Suction catheter  Vent Type: Servo i  Vent Mode: PRVC  Vt Ordered: 470 mL  Pressure Ordered: 8  Rate Set: 20 bmp  FiO2 : 40 %  Sensitivity: 5  PEEP/CPAP: 10  I Time/ I Time %: 0.7 s  Humidification Source: HME  Additional Respiratory  Assessments  Pulse: 123  Resp: 27  SpO2: 99 %  End Tidal CO2: 33 (%)  Position: Semi-Barron's  Humidification Source: HME  Oral Care Completed?: Yes  Oral Care: Mouthwash, Mouth suctioned  Subglottic Suction Done?: Yes    ABGs:     Laboratory findings:    Complete Blood Count: Recent Labs     08/20/19  0400 08/21/19 0149 08/21/19  0556   WBC 9.1 14.9* 14.3*   HGB 7.7* 5.4* 8.4*   HCT 22.6* 16.8* 25.6*   PLT See Reflexed IPF Result See Reflexed IPF Result See Reflexed IPF Result        Last 3 Blood Glucose:   Recent Labs     08/20/19  2350 08/21/19  0149 08/21/19  0556   GLUCOSE 127* 160* 166*        PT/INR:    Lab Results   Component Value Date    PROTIME 10.8 08/20/2019    INR 1.0 08/20/2019     PTT:    Lab Results   Component Value Date    APTT 27.4 08/20/2019       Comprehensive Metabolic Profile:   Recent Labs     08/20/19  2350 08/21/19  0149 08/21/19  0556    140 138   K 4.2 4.3 4.0    104 105   CO2 23 21 21   BUN 9 11 14   CREATININE 0.45* 0.48* 0.39*   GLUCOSE 127* 160* 166*   CALCIUM 7.9* 8.0* 7.8*   PROT  --   --  5.6* shows bilateral infiltrate not much change from yesterday although possibly less pleural effusion, she was hypotensive overnight she had fluid boluses and was started on Levophed and also for short while vasopressin which was stopped and she is on lower dose of Levophed at this time, arterial blood gases shows pH of 7.40 PCO2 39 PO2 64 this morning. Her blood cultures so far negative, bronchoalveolar lavage culture is negative so far, renal function is normal and AST is mildly elevated but bilirubin is increased to 4.60 with direct bilirubin of 3.29. Parvovirus antibody/PCR is pending, WBC count increased to 14, overnight her hemoglobin dropped again less than 6 and she had received 2 units packed RBCs her hemoglobin this morning is 8.4, her platelet count is increased to 61 and she did not require any transfusion yesterday of platelets. Will follow LDH every day and check reticulocyte count every day. We will follow-up daily LFTs for now  Wean Levophed. 2D echocardiogram pending. Follow-up echocardiogram.  Follow-up later pressure and peak inspiratory pressure. Platelet transfusion if increase endotracheal bleeding or worsening platelet count. Continue with ventilator support. Wean propofol drip and transition to Versed if hypotensive. Will wean fentanyl drip down. Follow-up parvovirus serology. Discussed with rheumatology and they do not find definite evidence of lupus to account for pulmonary alveolar hemorrhage and will continue to monitor closely for the need of pulses steroid, also discussed with infectious disease and especially with possible parvovirus/viral etiology will avoid steroids until definite evidence. On Levaquin and follow-up infectious disease. Will need hematology evaluation for hemoglobin electrophoresis and continued follow-up with thrombocytopenia. Follow-up urine output.     Total critical care time caring for this patient with life threatening, unstable organ failure, including direct patient contact, management of life support systems, review of data including imaging and labs, discussions with other team members and physicians at least 39  Min so far today, excluding procedures. Please note that this chart was generated using voice recognition Dragon dictation software. Although every effort was made to ensure the accuracy of this automated transcription, some errors in transcription may have occurred.       Brissa Leggett MD  8/21/2019 6:28 PM

## 2019-08-22 ENCOUNTER — APPOINTMENT (OUTPATIENT)
Dept: GENERAL RADIOLOGY | Age: 34
DRG: 207 | End: 2019-08-22
Payer: COMMERCIAL

## 2019-08-22 LAB
ABO/RH: NORMAL
ABSOLUTE EOS #: 0 K/UL (ref 0–0.4)
ABSOLUTE IMMATURE GRANULOCYTE: 0.49 K/UL (ref 0–0.3)
ABSOLUTE LYMPH #: 2.44 K/UL (ref 1–4.8)
ABSOLUTE MONO #: 0.61 K/UL (ref 0.1–0.8)
ABSOLUTE RETIC #: 0.06 M/UL (ref 0.03–0.08)
ALBUMIN SERPL-MCNC: 2.1 G/DL (ref 3.5–5.2)
ALBUMIN/GLOBULIN RATIO: 0.8 (ref 1–2.5)
ALLEN TEST: ABNORMAL
ALP BLD-CCNC: 87 U/L (ref 35–104)
ALT SERPL-CCNC: 27 U/L (ref 5–33)
ANION GAP SERPL CALCULATED.3IONS-SCNC: 11 MMOL/L (ref 9–17)
ANTIBODY SCREEN: NEGATIVE
ANTIGEN TYPE, PATIENT: NORMAL
ARM BAND NUMBER: NORMAL
AST SERPL-CCNC: 45 U/L
BASOPHILS # BLD: 0 % (ref 0–2)
BASOPHILS ABSOLUTE: 0 K/UL (ref 0–0.2)
BILIRUB SERPL-MCNC: 1.81 MG/DL (ref 0.3–1.2)
BILIRUBIN DIRECT: 0.66 MG/DL
BILIRUBIN, INDIRECT: 1.15 MG/DL (ref 0–1)
BLD PROD TYP BPU: NORMAL
BUN BLDV-MCNC: 18 MG/DL (ref 6–20)
BUN/CREAT BLD: ABNORMAL (ref 9–20)
CALCIUM SERPL-MCNC: 7 MG/DL (ref 8.6–10.4)
CHLORIDE BLD-SCNC: 109 MMOL/L (ref 98–107)
CO2: 20 MMOL/L (ref 20–31)
CREAT SERPL-MCNC: 0.38 MG/DL (ref 0.5–0.9)
CROSSMATCH RESULT: NORMAL
DAT, POLYSPECIFIC: NEGATIVE
DIFFERENTIAL TYPE: ABNORMAL
DISPENSE STATUS BLOOD BANK: NORMAL
EKG ATRIAL RATE: 121 BPM
EKG P AXIS: 65 DEGREES
EKG P-R INTERVAL: 116 MS
EKG Q-T INTERVAL: 316 MS
EKG QRS DURATION: 78 MS
EKG QTC CALCULATION (BAZETT): 448 MS
EKG R AXIS: 59 DEGREES
EKG T AXIS: 50 DEGREES
EKG VENTRICULAR RATE: 121 BPM
EOSINOPHILS RELATIVE PERCENT: 0 % (ref 1–4)
EXPIRATION DATE: NORMAL
FIO2: ABNORMAL
GBM ANTIBODY IGG: 4 AU/ML
GFR AFRICAN AMERICAN: >60 ML/MIN
GFR NON-AFRICAN AMERICAN: >60 ML/MIN
GFR SERPL CREATININE-BSD FRML MDRD: ABNORMAL ML/MIN/{1.73_M2}
GFR SERPL CREATININE-BSD FRML MDRD: ABNORMAL ML/MIN/{1.73_M2}
GLOBULIN: ABNORMAL G/DL (ref 1.5–3.8)
GLUCOSE BLD-MCNC: 122 MG/DL (ref 70–99)
HCT VFR BLD CALC: 22 % (ref 36.3–47.1)
HEMOGLOBIN: 7.4 G/DL (ref 11.9–15.1)
IMMATURE GRANULOCYTES: 4 %
IMMATURE RETIC FRACT: 28.7 % (ref 2.7–18.3)
LACTATE DEHYDROGENASE: 858 U/L (ref 135–214)
LYMPHOCYTES # BLD: 20 % (ref 24–44)
MCH RBC QN AUTO: 26.8 PG (ref 25.2–33.5)
MCHC RBC AUTO-ENTMCNC: 33.6 G/DL (ref 28.4–34.8)
MCV RBC AUTO: 79.7 FL (ref 82.6–102.9)
MODE: ABNORMAL
MONOCYTES # BLD: 5 % (ref 1–7)
MORPHOLOGY: ABNORMAL
NEGATIVE BASE EXCESS, ART: 3 (ref 0–2)
NRBC AUTOMATED: 126.3 PER 100 WBC
NUCLEATED RED BLOOD CELLS: 135 PER 100 WBC
O2 DEVICE/FLOW/%: ABNORMAL
PARVOVIRUS B19 IGG ANTIBODY: 0.82 IV
PARVOVIRUS B19 IGM ANTIBODY: 0.11 IV
PATIENT TEMP: 38.3
PDW BLD-RTO: 18.3 % (ref 11.8–14.4)
PLATELET # BLD: 89 K/UL (ref 138–453)
PLATELET ESTIMATE: ABNORMAL
PMV BLD AUTO: ABNORMAL FL (ref 8.1–13.5)
POC HCO3: 21 MMOL/L (ref 21–28)
POC O2 SATURATION: 91 % (ref 94–98)
POC PCO2 TEMP: 34 MM HG
POC PCO2: 31.9 MM HG (ref 35–48)
POC PH TEMP: 7.41
POC PH: 7.42 (ref 7.35–7.45)
POC PO2 TEMP: 64 MM HG
POC PO2: 58 MM HG (ref 83–108)
POSITIVE BASE EXCESS, ART: ABNORMAL (ref 0–3)
POTASSIUM SERPL-SCNC: 3.7 MMOL/L (ref 3.7–5.3)
RBC # BLD: 2.76 M/UL (ref 3.95–5.11)
RBC # BLD: ABNORMAL 10*6/UL
RETIC %: 2.2 % (ref 0.5–1.9)
RETIC HEMOGLOBIN: 22.2 PG (ref 28.2–35.7)
SAMPLE SITE: ABNORMAL
SEG NEUTROPHILS: 71 % (ref 36–66)
SEGMENTED NEUTROPHILS ABSOLUTE COUNT: 8.66 K/UL (ref 1.8–7.7)
SODIUM BLD-SCNC: 140 MMOL/L (ref 135–144)
TCO2 (CALC), ART: 22 MMOL/L (ref 22–29)
TOTAL PROTEIN: 4.9 G/DL (ref 6.4–8.3)
TRANSFUSION STATUS: NORMAL
UNIT DIVISION: 0
UNIT NUMBER: NORMAL
UNIT TAG COMMENT: NORMAL
WBC # BLD: 12.2 K/UL (ref 3.5–11.3)
WBC # BLD: ABNORMAL 10*3/UL

## 2019-08-22 PROCEDURE — 71045 X-RAY EXAM CHEST 1 VIEW: CPT

## 2019-08-22 PROCEDURE — 36620 INSERTION CATHETER ARTERY: CPT

## 2019-08-22 PROCEDURE — 85025 COMPLETE CBC W/AUTO DIFF WBC: CPT

## 2019-08-22 PROCEDURE — 6360000002 HC RX W HCPCS: Performed by: STUDENT IN AN ORGANIZED HEALTH CARE EDUCATION/TRAINING PROGRAM

## 2019-08-22 PROCEDURE — 37799 UNLISTED PX VASCULAR SURGERY: CPT

## 2019-08-22 PROCEDURE — 94640 AIRWAY INHALATION TREATMENT: CPT

## 2019-08-22 PROCEDURE — 6370000000 HC RX 637 (ALT 250 FOR IP): Performed by: STUDENT IN AN ORGANIZED HEALTH CARE EDUCATION/TRAINING PROGRAM

## 2019-08-22 PROCEDURE — 85045 AUTOMATED RETICULOCYTE COUNT: CPT

## 2019-08-22 PROCEDURE — 36415 COLL VENOUS BLD VENIPUNCTURE: CPT

## 2019-08-22 PROCEDURE — 2580000003 HC RX 258: Performed by: STUDENT IN AN ORGANIZED HEALTH CARE EDUCATION/TRAINING PROGRAM

## 2019-08-22 PROCEDURE — 83615 LACTATE (LD) (LDH) ENZYME: CPT

## 2019-08-22 PROCEDURE — 97530 THERAPEUTIC ACTIVITIES: CPT

## 2019-08-22 PROCEDURE — 82803 BLOOD GASES ANY COMBINATION: CPT

## 2019-08-22 PROCEDURE — 2000000000 HC ICU R&B

## 2019-08-22 PROCEDURE — 97161 PT EVAL LOW COMPLEX 20 MIN: CPT

## 2019-08-22 PROCEDURE — 94761 N-INVAS EAR/PLS OXIMETRY MLT: CPT

## 2019-08-22 PROCEDURE — 2700000000 HC OXYGEN THERAPY PER DAY

## 2019-08-22 PROCEDURE — 94003 VENT MGMT INPAT SUBQ DAY: CPT

## 2019-08-22 PROCEDURE — 6360000002 HC RX W HCPCS: Performed by: INTERNAL MEDICINE

## 2019-08-22 PROCEDURE — 80048 BASIC METABOLIC PNL TOTAL CA: CPT

## 2019-08-22 PROCEDURE — 80076 HEPATIC FUNCTION PANEL: CPT

## 2019-08-22 PROCEDURE — 99291 CRITICAL CARE FIRST HOUR: CPT | Performed by: INTERNAL MEDICINE

## 2019-08-22 PROCEDURE — 93010 ELECTROCARDIOGRAM REPORT: CPT | Performed by: INTERNAL MEDICINE

## 2019-08-22 PROCEDURE — 99232 SBSQ HOSP IP/OBS MODERATE 35: CPT | Performed by: INTERNAL MEDICINE

## 2019-08-22 PROCEDURE — 94770 HC ETCO2 MONITOR DAILY: CPT

## 2019-08-22 PROCEDURE — 99233 SBSQ HOSP IP/OBS HIGH 50: CPT | Performed by: INTERNAL MEDICINE

## 2019-08-22 RX ORDER — FOLIC ACID 1 MG/1
1 TABLET ORAL DAILY
Status: DISCONTINUED | OUTPATIENT
Start: 2019-08-22 | End: 2019-08-28 | Stop reason: HOSPADM

## 2019-08-22 RX ORDER — METHYLPREDNISOLONE SODIUM SUCCINATE 40 MG/ML
40 INJECTION, POWDER, LYOPHILIZED, FOR SOLUTION INTRAMUSCULAR; INTRAVENOUS EVERY 6 HOURS SCHEDULED
Status: DISCONTINUED | OUTPATIENT
Start: 2019-08-22 | End: 2019-08-24

## 2019-08-22 RX ORDER — METHYLPREDNISOLONE SODIUM SUCCINATE 40 MG/ML
40 INJECTION, POWDER, LYOPHILIZED, FOR SOLUTION INTRAMUSCULAR; INTRAVENOUS EVERY 8 HOURS
Status: DISCONTINUED | OUTPATIENT
Start: 2019-08-22 | End: 2019-08-22

## 2019-08-22 RX ADMIN — LEVALBUTEROL HYDROCHLORIDE 1.25 MG: 1.25 SOLUTION RESPIRATORY (INHALATION) at 08:00

## 2019-08-22 RX ADMIN — FOLIC ACID 1 MG: 1 TABLET ORAL at 08:14

## 2019-08-22 RX ADMIN — METHYLPREDNISOLONE SODIUM SUCCINATE 40 MG: 40 INJECTION, POWDER, FOR SOLUTION INTRAMUSCULAR; INTRAVENOUS at 12:48

## 2019-08-22 RX ADMIN — LEVALBUTEROL HYDROCHLORIDE 1.25 MG: 1.25 SOLUTION RESPIRATORY (INHALATION) at 11:22

## 2019-08-22 RX ADMIN — FAMOTIDINE 20 MG: 20 TABLET, FILM COATED ORAL at 08:14

## 2019-08-22 RX ADMIN — Medication 75 MCG/HR: at 03:42

## 2019-08-22 RX ADMIN — SODIUM CHLORIDE: 4.5 INJECTION, SOLUTION INTRAVENOUS at 18:00

## 2019-08-22 RX ADMIN — SODIUM CHLORIDE: 4.5 INJECTION, SOLUTION INTRAVENOUS at 08:16

## 2019-08-22 RX ADMIN — ACETAMINOPHEN 650 MG: 325 TABLET ORAL at 01:41

## 2019-08-22 RX ADMIN — LEVOFLOXACIN 750 MG: 5 INJECTION, SOLUTION INTRAVENOUS at 08:15

## 2019-08-22 RX ADMIN — SODIUM CHLORIDE, PRESERVATIVE FREE 10 ML: 5 INJECTION INTRAVENOUS at 19:56

## 2019-08-22 RX ADMIN — LEVALBUTEROL HYDROCHLORIDE 1.25 MG: 1.25 SOLUTION RESPIRATORY (INHALATION) at 15:11

## 2019-08-22 RX ADMIN — SODIUM CHLORIDE, PRESERVATIVE FREE 10 ML: 5 INJECTION INTRAVENOUS at 08:14

## 2019-08-22 RX ADMIN — LEVALBUTEROL HYDROCHLORIDE 1.25 MG: 1.25 SOLUTION RESPIRATORY (INHALATION) at 23:31

## 2019-08-22 RX ADMIN — FAMOTIDINE 20 MG: 20 TABLET, FILM COATED ORAL at 19:56

## 2019-08-22 RX ADMIN — Medication 6 MG/HR: at 01:58

## 2019-08-22 RX ADMIN — LEVALBUTEROL HYDROCHLORIDE 1.25 MG: 1.25 SOLUTION RESPIRATORY (INHALATION) at 03:12

## 2019-08-22 RX ADMIN — LEVALBUTEROL HYDROCHLORIDE 1.25 MG: 1.25 SOLUTION RESPIRATORY (INHALATION) at 19:31

## 2019-08-22 RX ADMIN — METHYLPREDNISOLONE SODIUM SUCCINATE 40 MG: 40 INJECTION, POWDER, FOR SOLUTION INTRAMUSCULAR; INTRAVENOUS at 18:09

## 2019-08-22 RX ADMIN — ACETAMINOPHEN 650 MG: 325 TABLET ORAL at 08:14

## 2019-08-22 RX ADMIN — Medication 75 MCG/HR: at 15:50

## 2019-08-22 ASSESSMENT — PAIN SCALES - GENERAL
PAINLEVEL_OUTOF10: 0

## 2019-08-22 ASSESSMENT — PULMONARY FUNCTION TESTS
PIF_VALUE: 23
PIF_VALUE: 19
PIF_VALUE: 16
PIF_VALUE: 19
PIF_VALUE: 20
PIF_VALUE: 20
PIF_VALUE: 23
PIF_VALUE: 25
PIF_VALUE: 19
PIF_VALUE: 20
PIF_VALUE: 25

## 2019-08-22 NOTE — PLAN OF CARE
Problem: OXYGENATION/RESPIRATORY FUNCTION  Goal: Patient will maintain patent airway  8/22/2019 0929 by Maxine Alvarenga, RCP  Outcome: Ongoing  8/22/2019 0928 by Maxine Alvarenga, RCP  Outcome: Ongoing  8/21/2019 2322 by Keira Aguayo RN  Outcome: Ongoing     Problem: OXYGENATION/RESPIRATORY FUNCTION  Goal: Patient will achieve/maintain normal respiratory rate/effort  Description  Respiratory rate and effort will be within normal limits for the patient  8/22/2019 2028 by Maxine Alvarenga, RCP  Outcome: Ongoing  8/22/2019 0928 by Maxine Alvarenga, RCP  Outcome: Ongoing  8/21/2019 2322 by Keira Aguayo RN  Outcome: Ongoing     Problem: MECHANICAL VENTILATION  Goal: Patient will maintain patent airway  8/22/2019 0929 by Maxine Alvarenga, RCP  Outcome: Ongoing  8/22/2019 0928 by Maxine Alvarenga, RCP  Outcome: Ongoing  8/21/2019 2322 by Keira Aguayo RN  Outcome: Ongoing     Problem: MECHANICAL VENTILATION  Goal: Oral health is maintained or improved  8/22/2019 0929 by Maxine Alvarenga, RCP  Outcome: Ongoing  8/22/2019 0928 by Maxine Alvarenga, RCP  Outcome: Ongoing  8/21/2019 2322 by Keira Augayo RN  Outcome: Ongoing     Problem: MECHANICAL VENTILATION  Goal: ET tube will be managed safely  8/22/2019 0929 by CLAUDIA McgeeP  Outcome: Ongoing  8/22/2019 0928 by CLAUDIA McgeeP  Outcome: Ongoing  8/21/2019 2322 by Keria Aguayo RN  Outcome: Ongoing

## 2019-08-22 NOTE — PROGRESS NOTES
Nutrition Assessment (Enteral Nutrition)    Type and Reason for Visit: Reassess    Nutrition Recommendations: Modify current Tube Feeding-Suggest change TF to Standard without Fiber goal 65 mL/hr now that pt is off propofol. Will continue to monitor TF tolerance/adequacy. Nutrition Assessment: Pt improving nutritionally- Low Chapo, High Pro Tube Feeding started on 8/20/19 and is currently at 45 ml/hr. Tolerating TF well per RN. Noted pt is no longer on propofol; will adjust TF to better meet est kcal needs.      Malnutrition Assessment:  · Malnutrition Status: Insufficient data  · Context: Acute illness or injury    Nutrition Risk Level: High    Nutrition Needs:  · Estimated Daily Total Kcal: 8442-2932 kcal/day   · Estimated Daily Protein (g): 90 g pro/day     Nutrition Diagnosis:   · Problem: Inadequate oral intake  · Etiology: related to Impaired respiratory function-inability to consume food     Signs and symptoms:  as evidenced by NPO status due to medical condition, Nutrition support - EN    Objective Information:  · Current Nutrition Therapies:  · Oral Diet Orders: NPO   · Tube Feeding (TF) Orders:   · Formula: Low Calorie, High Protein  · Rate (ml/hr):45 mL/hr     · Volume (ml/day): 1080 ml/day  · Duration: Continuous  · Current TF & Flush Orders Provides: 1080 kcal and 95 g pro/day  · Goal TF & Flush Orders Provides: Standard without Fiber goal 65 ml/hr will provide 1872 kcal and 87 g pro/day   · Additional Calories: None -propofol discontinued   · Anthropometric Measures:  · Ht: 5' 6\" (167.6 cm)   · Current Body Wt: 203 lb 11.3 oz (92.4 kg)  · Admission Body Wt: 181 lb 7 oz (82.3 kg)   · Ideal Body Wt: 130 lb (59 kg), % Ideal Body 139% (adm/ideal)  · BMI Classification: BMI 30.0 - 34.9 Obese Class I    Nutrition Interventions:   Modify current Tube Feeding(Suggest change TF to Standard without Fiber goal 65 mL/hr now that pt is off propofol. )  Continued Inpatient Monitoring, Education Not

## 2019-08-22 NOTE — PROGRESS NOTES
[facility] - Progress Note    Today's Date and Time: 8/22/2019, 8:30 AM    Impression :   · Sickle cell crisis  · Aplastic crisis  · Severe Mycoplasma pneumoniae pneumonia vs ARDS  · Microcytic anemia  · Thrombocytopenia  · Bleeding per respiratory tract    Recommendations:   · Continue Levaquin 750 mg IV q 24 hr      Medical Decision Making/Summary/Discussion:8/22/2019     · Patient with underlying sickle cell and beta thalassemia  · Developed respiratory infection with extensive pulmonary involvement  · Has required intubation because of hypoxic respiratory failure  · Serologic tests show positive IgM Mycoplasma antibody  · CBC shows pancytopenia, possible aplastic crisis  · Plan to treat with IV Levaquin pending exclusion of any other associated pathogens. If none levaquin dose can be subsequently decreased to 500 mg/day as Mycoplasma have very low MICs to quinolones. · Suggest cold agglutinins to exclude cold agglutinin hemolytic anemia, exclude Parvovirus B 19 co-infection. · 8-22-19 Patient remains intubated. FI02 45% PEEP 10, secretions bloody. BP stable, off pressors. Infection Control Recommendations   · Universal Precautions  · Droplet isolation    Antimicrobial Stewardship Recommendations     · Targeted therapy  Coordination of Outpatient Care:   · Estimated Length of IV antimicrobials: 8-29-19  · Patient will need Midline Catheter Insertion: Yes  · Patient will need PICC line Insertion:No  · Patient will need: Home IV , [place],  SNF,  LTAC:TBD  · Patient will need outpatient wound care:No    Chief complaint/reason for consultation:   · Sickle cell crisis and possible community acquired pneumonia vs ARDS      History of Present Illness:   Adan De León is a 29y.o.-year-old  female who was initially admitted on 8/18/2019.      INITIAL EVALUATION:     Pt is a 29year old female with a past history of sickle cell anemia and beta thalasemia who presented to [facility] ED transferred from an outside hospital on 8/18/19 because of generalized chils, pain, malaise and non productive cough. Pt lives in [place] and had been in [place] for [employer] this past week. On 8/14/19 she started to experience generalized chills, pain, and weakness in her legs. On 8/18/19 as she left [place] she developed nonproductive cough, increased generalized pain and weakness, and increased malaise. EMS was called and taken to [facility]. Pt was then transferred to [facility] because of tachycardia and tachypenia. Upon arrival to [facility] the patient was placed on nonrebreather mask due to low O2 saturations. She was then switched to BiPAP. Her Hb had dropped to 5.5, she was given 1 unit of PRBC. Of note, Pt is from [place] originally and had told the ICU resident that she was diagnosed with sickle cell and beta thalassemia in [place] and was treated with Folic acid. Her last crisis was more than 10 years ago. Upon examination today, currently transitioned to intubation this afternoon after decreased sats. She was on BiPAP earlier this morning. The patient was very drowsy this morning and unable to give history or follow commands. She was able to weakly hand squeeze BL and move her toes BLE. Nurse noted that she was more awake earlier when she was on the nonrebreather mask. Pt does awake briefly for painful stimuli. Pt has increased heart rate and increased respiratory rate, along with O2 at 100%. ICU concerned for multifocal pneumonia vs ARDS  Currently on Levaquin only. CURRENT EVALUATION: 8/22/2019    Patient evaluated and examined in the ICU. Pt remains intubated. Patient is currently on droplet precautions. Patient has continued to have some pulmonary bleeding. Hb 8.4  Plat 61    Afebrile  VS stable  Remains on ventilator FI02 50--45%, PEEP 10, not weaning  CXR 8-22-19: No change    Viral PCR panel negative, Parvovirus negative.       Labs, X

## 2019-08-22 NOTE — PROGRESS NOTES
mcg Q1H PRN   Or     fentanNYL 50 mcg Q1H PRN   sodium chloride flush 10 mL PRN   magnesium hydroxide 30 mL Daily PRN   ondansetron 4 mg Q6H PRN   potassium chloride 10 mEq PRN   magnesium sulfate 1 g PRN   acetaminophen 650 mg Q4H PRN   acetaminophen 650 mg Q4H PRN   metoprolol 5 mg Q6H PRN   propofol 20 mg PRN       SUPPORT DEVICES: [x] Ventilator [] BIPAP  [] Nasal Cannula [] Room Air    VENT SETTINGS (Comprehensive) (if applicable):  Vent Information  $Ventilation: $Subsequent Day  Ventilator Started: (S) Yes  Skin Assessment: Clean, dry, & intact  Equipment Changed: HME  Vent Type: Servo i  Vent Mode: PRVC  Vt Ordered: 470 mL  Pressure Ordered: 8  Rate Set: 20 bmp  FiO2 : 45 %  Sensitivity: 5  PEEP/CPAP: 10  I Time/ I Time %: 0.7 s  Humidification Source: HME  Additional Respiratory  Assessments  Pulse: 128  Resp: 29  SpO2: 100 %  End Tidal CO2: 34 (%)  Position: Semi-Barron's  Humidification Source: HME  Oral Care Completed?: Yes  Oral Care: Teeth brushed, Mouthwash  Subglottic Suction Done?: Yes    ABGs:     Lab Results   Component Value Date    NZN3ICF 22 08/22/2019    FIO2 NOT REPORTED 08/22/2019     Lactic Acid:   Lab Results   Component Value Date    LACTA NOT REPORTED 08/18/2019         DATA:  Complete Blood Count: Recent Labs     08/21/19  0149 08/21/19  0556 08/21/19  1624 08/22/19  0558   WBC 14.9* 14.3*  --  12.2*   HGB 5.4* 8.4* 8.2* 7.4*   MCV 78.9* 79.5*  --  79.7*   PLT See Reflexed IPF Result See Reflexed IPF Result  --  89*   RBC 2.13* 3.22*  --  2.76*   HCT 16.8* 25.6* 24.0* 22.0*   MCH 25.4 26.1  --  26.8   MCHC 32.1 32.8  --  33.6   RDW 17.4* 16.5*  --  18.3*   MPV NOT REPORTED NOT REPORTED  --  NOT REPORTED        PT/INR:    Lab Results   Component Value Date    PROTIME 10.8 08/20/2019    INR 1.0 08/20/2019     PTT:    Lab Results   Component Value Date    APTT 27.4 08/20/2019       Basal Metabolic Profile:   Recent Labs     08/21/19  0149 08/21/19  0556 08/22/19  0430    138 140   K 4.3 4.0 3.7   BUN 11 14 18   CREATININE 0.48* 0.39* 0.38*    105 109*   CO2 21 21 20      Magnesium:   Lab Results   Component Value Date    MG 1.8 08/20/2019    MG 1.9 08/18/2019     Phosphorus: No results found for: PHOS  S. Calcium:  Recent Labs     08/22/19  0430   CALCIUM 7.0*     S. Ionized Calcium:No results for input(s): IONCA in the last 72 hours. Urinalysis: Lab Results   Component Value Date    NITRU POSITIVE 08/21/2019    COLORU DARK YELLOW 08/21/2019    PHUR 6.0 08/21/2019    WBCUA 0 TO 2 08/21/2019    RBCUA 2 TO 5 08/21/2019    MUCUS NOT REPORTED 08/21/2019    TRICHOMONAS NOT REPORTED 08/21/2019    YEAST NOT REPORTED 08/21/2019    BACTERIA NOT REPORTED 08/21/2019    SPECGRAV 1.019 08/21/2019    LEUKOCYTESUR NEGATIVE 08/21/2019    UROBILINOGEN Normal 08/21/2019    BILIRUBINUR MOD 08/21/2019    GLUCOSEU NEGATIVE 08/21/2019    KETUA NEGATIVE 08/21/2019    AMORPHOUS NOT REPORTED 08/21/2019       CARDIAC ENZYMES: No results for input(s): CKMB, CKMBINDEX, TROPONINI in the last 72 hours. Invalid input(s): CKTOTAL;3  BNP: No results for input(s): BNP in the last 72 hours. LFTS  Recent Labs     08/20/19  0400 08/21/19  0556 08/22/19  0430   ALKPHOS 67 151* 87   ALT 16 25 27   AST 27 47* 45*   BILITOT 2.63* 4.60* 1.81*   BILIDIR 1.10* 3.29* 0.66*   LABALBU 2.7* 2.4* 2.1*       AMYLASE/LIPASE/AMMONIA  No results for input(s): AMYLASE, LIPASE, AMMONIA in the last 72 hours. Last 3 Blood Glucose:   Recent Labs     08/20/19  0400 08/20/19  2350 08/21/19  0149 08/21/19  0556 08/22/19  0430   GLUCOSE 124* 127* 160* 166* 122*      HgBA1c:  No results found for: LABA1C      TSH:    Lab Results   Component Value Date    TSH 1.78 08/19/2019     ANEMIA STUDIES  No results for input(s): LABIRON, TIBC, FERRITIN, [id number], FOLATE, OCCULTBLD in the last 72 hours.       Cultures during this admission:     Blood cultures:                 [] None drawn      [x] Negative             []  Positive (Details:

## 2019-08-22 NOTE — PROGRESS NOTES
Insert Arterial Line  Date/Time:  08/22/19, 6:43 AM  Performed by: Elizabeth Diaz    Patient identity confirmed: arm band and provided demographic data   Time out: Immediately prior to procedure a \"time out\" was called to verify the correct patient, procedure, equipment, support staff. Preparation: Patient was prepped and draped in the usual sterile fashion.     Location:right radial    Luke's test normal: yes  Needle gauge: 20     Number of attempts: 1  Post-procedure: transparent dressing applied and line secured    Patient tolerance: well

## 2019-08-23 ENCOUNTER — APPOINTMENT (OUTPATIENT)
Dept: GENERAL RADIOLOGY | Age: 34
DRG: 207 | End: 2019-08-23
Payer: COMMERCIAL

## 2019-08-23 LAB
ABSOLUTE EOS #: 0 K/UL (ref 0–0.4)
ABSOLUTE IMMATURE GRANULOCYTE: 0.2 K/UL (ref 0–0.3)
ABSOLUTE LYMPH #: 1.11 K/UL (ref 1–4.8)
ABSOLUTE MONO #: 0.3 K/UL (ref 0.1–0.8)
ABSOLUTE RETIC #: 0.07 M/UL (ref 0.03–0.08)
ALBUMIN SERPL-MCNC: 2.6 G/DL (ref 3.5–5.2)
ALBUMIN/GLOBULIN RATIO: 0.7 (ref 1–2.5)
ALLEN TEST: ABNORMAL
ALP BLD-CCNC: 98 U/L (ref 35–104)
ALT SERPL-CCNC: 50 U/L (ref 5–33)
ANION GAP SERPL CALCULATED.3IONS-SCNC: 12 MMOL/L (ref 9–17)
ANTICARDIOLIPIN IGA ANTIBODY: 1.6 APU
ANTICARDIOLIPIN IGG ANTIBODY: 1.4 GPU
AST SERPL-CCNC: 58 U/L
BASOPHILS # BLD: 0 % (ref 0–2)
BASOPHILS ABSOLUTE: 0 K/UL (ref 0–0.2)
BILIRUB SERPL-MCNC: 1.55 MG/DL (ref 0.3–1.2)
BILIRUBIN DIRECT: 0.54 MG/DL
BILIRUBIN, INDIRECT: 1.01 MG/DL (ref 0–1)
BUN BLDV-MCNC: 17 MG/DL (ref 6–20)
BUN/CREAT BLD: ABNORMAL (ref 9–20)
CALCIUM SERPL-MCNC: 8.6 MG/DL (ref 8.6–10.4)
CARDIOLIPIN AB IGM: 8.3 MPU
CHLORIDE BLD-SCNC: 103 MMOL/L (ref 98–107)
CO2: 25 MMOL/L (ref 20–31)
COLD AGGLUTININ TITER: NORMAL
CREAT SERPL-MCNC: 0.39 MG/DL (ref 0.5–0.9)
DIFFERENTIAL TYPE: ABNORMAL
DILUTE RUSSELL VIPER VENOM TIME: NORMAL
EOSINOPHILS RELATIVE PERCENT: 0 % (ref 1–4)
FIO2: ABNORMAL
GFR AFRICAN AMERICAN: >60 ML/MIN
GFR NON-AFRICAN AMERICAN: >60 ML/MIN
GFR SERPL CREATININE-BSD FRML MDRD: ABNORMAL ML/MIN/{1.73_M2}
GFR SERPL CREATININE-BSD FRML MDRD: ABNORMAL ML/MIN/{1.73_M2}
GLOBULIN: ABNORMAL G/DL (ref 1.5–3.8)
GLUCOSE BLD-MCNC: 239 MG/DL (ref 65–105)
GLUCOSE BLD-MCNC: 242 MG/DL (ref 65–105)
GLUCOSE BLD-MCNC: 243 MG/DL (ref 70–99)
GLUCOSE BLD-MCNC: 246 MG/DL (ref 65–105)
GLUCOSE BLD-MCNC: 255 MG/DL (ref 74–100)
HCT VFR BLD CALC: 24.2 % (ref 36.3–47.1)
HEMOGLOBIN: 7.7 G/DL (ref 11.9–15.1)
IMMATURE GRANULOCYTES: 2 %
IMMATURE RETIC FRACT: 36.9 % (ref 2.7–18.3)
INR BLD: 1
LACTATE DEHYDROGENASE: 916 U/L (ref 135–214)
LUPUS ANTICOAG: NORMAL
LYMPHOCYTES # BLD: 11 % (ref 24–44)
MCH RBC QN AUTO: 25.8 PG (ref 25.2–33.5)
MCHC RBC AUTO-ENTMCNC: 31.8 G/DL (ref 28.4–34.8)
MCV RBC AUTO: 81.2 FL (ref 82.6–102.9)
MODE: ABNORMAL
MONOCYTES # BLD: 3 % (ref 1–7)
MORPHOLOGY: ABNORMAL
NEGATIVE BASE EXCESS, ART: ABNORMAL (ref 0–2)
NRBC AUTOMATED: 190 PER 100 WBC
NUCLEATED RED BLOOD CELLS: 190 PER 100 WBC
O2 DEVICE/FLOW/%: ABNORMAL
PARTIAL THROMBOPLASTIN TIME: 27.4 SEC (ref 20.5–30.5)
PARVOVIRUS B19, PCR: NOT DETECTED
PARVOVIRUS SOURCE: NORMAL
PATIENT TEMP: ABNORMAL
PDW BLD-RTO: 19.7 % (ref 11.8–14.4)
PLATELET # BLD: ABNORMAL K/UL (ref 138–453)
PLATELET ESTIMATE: ABNORMAL
PLATELET, FLUORESCENCE: 113 K/UL (ref 138–453)
PLATELET, IMMATURE FRACTION: 7.9 % (ref 1.1–10.3)
PMV BLD AUTO: ABNORMAL FL (ref 8.1–13.5)
POC HCO3: 27.6 MMOL/L (ref 21–28)
POC O2 SATURATION: 96 % (ref 94–98)
POC PCO2 TEMP: ABNORMAL MM HG
POC PCO2: 44.1 MM HG (ref 35–48)
POC PH TEMP: ABNORMAL
POC PH: 7.4 (ref 7.35–7.45)
POC PO2 TEMP: ABNORMAL MM HG
POC PO2: 79.1 MM HG (ref 83–108)
POSITIVE BASE EXCESS, ART: 3 (ref 0–3)
POTASSIUM SERPL-SCNC: 4.2 MMOL/L (ref 3.7–5.3)
PROTHROMBIN TIME: 10.8 SEC (ref 9–12)
RBC # BLD: 2.98 M/UL (ref 3.95–5.11)
RBC # BLD: ABNORMAL 10*6/UL
RETIC %: 2.4 % (ref 0.5–1.9)
RETIC HEMOGLOBIN: 22 PG (ref 28.2–35.7)
SAMPLE SITE: ABNORMAL
SEG NEUTROPHILS: 84 % (ref 36–66)
SEGMENTED NEUTROPHILS ABSOLUTE COUNT: 8.49 K/UL (ref 1.8–7.7)
SODIUM BLD-SCNC: 140 MMOL/L (ref 135–144)
TCO2 (CALC), ART: 29 MMOL/L (ref 22–29)
TOTAL PROTEIN: 6.2 G/DL (ref 6.4–8.3)
WBC # BLD: 10.1 K/UL (ref 3.5–11.3)
WBC # BLD: ABNORMAL 10*3/UL

## 2019-08-23 PROCEDURE — 85055 RETICULATED PLATELET ASSAY: CPT

## 2019-08-23 PROCEDURE — 99232 SBSQ HOSP IP/OBS MODERATE 35: CPT | Performed by: INTERNAL MEDICINE

## 2019-08-23 PROCEDURE — 80048 BASIC METABOLIC PNL TOTAL CA: CPT

## 2019-08-23 PROCEDURE — 37799 UNLISTED PX VASCULAR SURGERY: CPT

## 2019-08-23 PROCEDURE — 6370000000 HC RX 637 (ALT 250 FOR IP): Performed by: STUDENT IN AN ORGANIZED HEALTH CARE EDUCATION/TRAINING PROGRAM

## 2019-08-23 PROCEDURE — 85025 COMPLETE CBC W/AUTO DIFF WBC: CPT

## 2019-08-23 PROCEDURE — 2000000000 HC ICU R&B

## 2019-08-23 PROCEDURE — 94770 HC ETCO2 MONITOR DAILY: CPT

## 2019-08-23 PROCEDURE — 80076 HEPATIC FUNCTION PANEL: CPT

## 2019-08-23 PROCEDURE — 71045 X-RAY EXAM CHEST 1 VIEW: CPT

## 2019-08-23 PROCEDURE — 6360000002 HC RX W HCPCS: Performed by: INTERNAL MEDICINE

## 2019-08-23 PROCEDURE — 2500000003 HC RX 250 WO HCPCS: Performed by: STUDENT IN AN ORGANIZED HEALTH CARE EDUCATION/TRAINING PROGRAM

## 2019-08-23 PROCEDURE — 85045 AUTOMATED RETICULOCYTE COUNT: CPT

## 2019-08-23 PROCEDURE — 6360000002 HC RX W HCPCS: Performed by: STUDENT IN AN ORGANIZED HEALTH CARE EDUCATION/TRAINING PROGRAM

## 2019-08-23 PROCEDURE — 2700000000 HC OXYGEN THERAPY PER DAY

## 2019-08-23 PROCEDURE — 82803 BLOOD GASES ANY COMBINATION: CPT

## 2019-08-23 PROCEDURE — 2580000003 HC RX 258: Performed by: STUDENT IN AN ORGANIZED HEALTH CARE EDUCATION/TRAINING PROGRAM

## 2019-08-23 PROCEDURE — 6370000000 HC RX 637 (ALT 250 FOR IP): Performed by: INTERNAL MEDICINE

## 2019-08-23 PROCEDURE — 94640 AIRWAY INHALATION TREATMENT: CPT

## 2019-08-23 PROCEDURE — 99291 CRITICAL CARE FIRST HOUR: CPT | Performed by: INTERNAL MEDICINE

## 2019-08-23 PROCEDURE — 99231 SBSQ HOSP IP/OBS SF/LOW 25: CPT | Performed by: INTERNAL MEDICINE

## 2019-08-23 PROCEDURE — 36415 COLL VENOUS BLD VENIPUNCTURE: CPT

## 2019-08-23 PROCEDURE — 94761 N-INVAS EAR/PLS OXIMETRY MLT: CPT

## 2019-08-23 PROCEDURE — 83615 LACTATE (LD) (LDH) ENZYME: CPT

## 2019-08-23 PROCEDURE — 94003 VENT MGMT INPAT SUBQ DAY: CPT

## 2019-08-23 PROCEDURE — 82947 ASSAY GLUCOSE BLOOD QUANT: CPT

## 2019-08-23 PROCEDURE — 97110 THERAPEUTIC EXERCISES: CPT

## 2019-08-23 RX ORDER — HYDRALAZINE HYDROCHLORIDE 20 MG/ML
10 INJECTION INTRAMUSCULAR; INTRAVENOUS EVERY 6 HOURS PRN
Status: DISCONTINUED | OUTPATIENT
Start: 2019-08-23 | End: 2019-08-24

## 2019-08-23 RX ORDER — DEXTROSE MONOHYDRATE 50 MG/ML
100 INJECTION, SOLUTION INTRAVENOUS PRN
Status: DISCONTINUED | OUTPATIENT
Start: 2019-08-23 | End: 2019-08-28 | Stop reason: HOSPADM

## 2019-08-23 RX ORDER — DEXTROSE MONOHYDRATE 25 G/50ML
12.5 INJECTION, SOLUTION INTRAVENOUS PRN
Status: DISCONTINUED | OUTPATIENT
Start: 2019-08-23 | End: 2019-08-28 | Stop reason: HOSPADM

## 2019-08-23 RX ORDER — LABETALOL HYDROCHLORIDE 5 MG/ML
10 INJECTION, SOLUTION INTRAVENOUS
Status: DISCONTINUED | OUTPATIENT
Start: 2019-08-23 | End: 2019-08-28 | Stop reason: HOSPADM

## 2019-08-23 RX ORDER — NICOTINE POLACRILEX 4 MG
15 LOZENGE BUCCAL PRN
Status: DISCONTINUED | OUTPATIENT
Start: 2019-08-23 | End: 2019-08-28 | Stop reason: HOSPADM

## 2019-08-23 RX ORDER — AMLODIPINE BESYLATE 5 MG/1
5 TABLET ORAL DAILY
Status: DISCONTINUED | OUTPATIENT
Start: 2019-08-23 | End: 2019-08-24

## 2019-08-23 RX ORDER — ALBUTEROL SULFATE 2.5 MG/3ML
2.5 SOLUTION RESPIRATORY (INHALATION) EVERY 6 HOURS PRN
Status: DISCONTINUED | OUTPATIENT
Start: 2019-08-23 | End: 2019-08-28 | Stop reason: HOSPADM

## 2019-08-23 RX ORDER — LABETALOL HYDROCHLORIDE 5 MG/ML
10 INJECTION, SOLUTION INTRAVENOUS EVERY 4 HOURS PRN
Status: DISCONTINUED | OUTPATIENT
Start: 2019-08-23 | End: 2019-08-23

## 2019-08-23 RX ORDER — CHLORHEXIDINE GLUCONATE 0.12 MG/ML
15 RINSE ORAL 2 TIMES DAILY
Status: DISCONTINUED | OUTPATIENT
Start: 2019-08-23 | End: 2019-08-28 | Stop reason: HOSPADM

## 2019-08-23 RX ORDER — AMLODIPINE BESYLATE 5 MG/1
5 TABLET ORAL ONCE
Status: COMPLETED | OUTPATIENT
Start: 2019-08-23 | End: 2019-08-23

## 2019-08-23 RX ADMIN — AMLODIPINE BESYLATE 5 MG: 5 TABLET ORAL at 17:08

## 2019-08-23 RX ADMIN — METHYLPREDNISOLONE SODIUM SUCCINATE 40 MG: 40 INJECTION, POWDER, FOR SOLUTION INTRAMUSCULAR; INTRAVENOUS at 05:56

## 2019-08-23 RX ADMIN — INSULIN LISPRO 4 UNITS: 100 INJECTION, SOLUTION INTRAVENOUS; SUBCUTANEOUS at 12:14

## 2019-08-23 RX ADMIN — LEVALBUTEROL HYDROCHLORIDE 1.25 MG: 1.25 SOLUTION RESPIRATORY (INHALATION) at 03:08

## 2019-08-23 RX ADMIN — METHYLPREDNISOLONE SODIUM SUCCINATE 40 MG: 40 INJECTION, POWDER, FOR SOLUTION INTRAMUSCULAR; INTRAVENOUS at 23:29

## 2019-08-23 RX ADMIN — Medication 10 MG: at 14:26

## 2019-08-23 RX ADMIN — Medication 100 MCG/HR: at 19:18

## 2019-08-23 RX ADMIN — METHYLPREDNISOLONE SODIUM SUCCINATE 40 MG: 40 INJECTION, POWDER, FOR SOLUTION INTRAMUSCULAR; INTRAVENOUS at 12:18

## 2019-08-23 RX ADMIN — SODIUM CHLORIDE: 4.5 INJECTION, SOLUTION INTRAVENOUS at 01:48

## 2019-08-23 RX ADMIN — FAMOTIDINE 20 MG: 20 TABLET, FILM COATED ORAL at 08:35

## 2019-08-23 RX ADMIN — AMLODIPINE BESYLATE 5 MG: 5 TABLET ORAL at 15:12

## 2019-08-23 RX ADMIN — FAMOTIDINE 20 MG: 20 TABLET, FILM COATED ORAL at 20:17

## 2019-08-23 RX ADMIN — SODIUM CHLORIDE: 4.5 INJECTION, SOLUTION INTRAVENOUS at 17:08

## 2019-08-23 RX ADMIN — Medication 10 MG: at 12:36

## 2019-08-23 RX ADMIN — Medication 10 MG: at 10:45

## 2019-08-23 RX ADMIN — Medication 100 MCG/HR: at 01:50

## 2019-08-23 RX ADMIN — FOLIC ACID 1 MG: 1 TABLET ORAL at 08:35

## 2019-08-23 RX ADMIN — Medication 10 ML: at 17:12

## 2019-08-23 RX ADMIN — Medication 15 ML: at 08:40

## 2019-08-23 RX ADMIN — Medication 10 ML: at 18:18

## 2019-08-23 RX ADMIN — PROPOFOL 15 MCG/KG/MIN: 10 INJECTION, EMULSION INTRAVENOUS at 10:09

## 2019-08-23 RX ADMIN — LEVOFLOXACIN 750 MG: 5 INJECTION, SOLUTION INTRAVENOUS at 08:35

## 2019-08-23 RX ADMIN — INSULIN LISPRO 6 UNITS: 100 INJECTION, SOLUTION INTRAVENOUS; SUBCUTANEOUS at 04:34

## 2019-08-23 RX ADMIN — SODIUM CHLORIDE, PRESERVATIVE FREE 10 ML: 5 INJECTION INTRAVENOUS at 20:18

## 2019-08-23 RX ADMIN — METHYLPREDNISOLONE SODIUM SUCCINATE 40 MG: 40 INJECTION, POWDER, FOR SOLUTION INTRAMUSCULAR; INTRAVENOUS at 00:20

## 2019-08-23 RX ADMIN — INSULIN LISPRO 4 UNITS: 100 INJECTION, SOLUTION INTRAVENOUS; SUBCUTANEOUS at 22:39

## 2019-08-23 RX ADMIN — PROPOFOL 25 MCG/KG/MIN: 10 INJECTION, EMULSION INTRAVENOUS at 17:14

## 2019-08-23 RX ADMIN — SODIUM CHLORIDE, PRESERVATIVE FREE 10 ML: 5 INJECTION INTRAVENOUS at 10:46

## 2019-08-23 RX ADMIN — LEVALBUTEROL HYDROCHLORIDE 1.25 MG: 1.25 SOLUTION RESPIRATORY (INHALATION) at 08:26

## 2019-08-23 RX ADMIN — Medication 100 MCG/HR: at 10:11

## 2019-08-23 RX ADMIN — INSULIN LISPRO 4 UNITS: 100 INJECTION, SOLUTION INTRAVENOUS; SUBCUTANEOUS at 16:34

## 2019-08-23 RX ADMIN — METHYLPREDNISOLONE SODIUM SUCCINATE 40 MG: 40 INJECTION, POWDER, FOR SOLUTION INTRAMUSCULAR; INTRAVENOUS at 17:12

## 2019-08-23 RX ADMIN — HYDRALAZINE HYDROCHLORIDE 10 MG: 20 INJECTION INTRAMUSCULAR; INTRAVENOUS at 18:17

## 2019-08-23 RX ADMIN — Medication 10 MG: at 21:29

## 2019-08-23 ASSESSMENT — PULMONARY FUNCTION TESTS
PIF_VALUE: 17
PIF_VALUE: 13
PIF_VALUE: 20
PIF_VALUE: 13
PIF_VALUE: 16
PIF_VALUE: 15
PIF_VALUE: 14
PIF_VALUE: 10
PIF_VALUE: 16
PIF_VALUE: 17
PIF_VALUE: 21
PIF_VALUE: 19
PIF_VALUE: 17
PIF_VALUE: 21
PIF_VALUE: 22
PIF_VALUE: 20

## 2019-08-23 NOTE — PROGRESS NOTES
Today's Date: 2019  Patient Name: Enoch Hampton  Date of admission: 2019 10:32 PM  Patient's age: 29 y.o., 1985  Admission Dx: Sickle cell crisis ([facility] [address] [D57.00]      Requesting Physician: Renee Ontiveros MD    INTERIM HISTORY  Pt is seen and evaluated. She continues to be intubated. No further bleeding appreciated but there is still some blood in the endotracheal tube. Hemoglobin has been stable and platelets continue to improve. Hemoglobin at baseline is about 12 and platelets are about 124 reflecting sickle thalassemia trait. HISTORY OF PRESENT ILLNESS:      The patient is a 29 y.o.  female who is admitted to the hospital for progressive respiratory failure, she was found to have pancytopenia and was intubated in ICU. The patient had significant hemoptysis and was diagnosed with diffuse alveolar hemorrhage. The patient does not live in town and she was on a travel bus. So I do not have any previous information from her. Patient is intubated in ICU and all information were obtained from the chart. She is originally from [place] and she had a diagnosis of sickle cell/thalassemia trait. She has very infrequent pain crises. Past Medical History:   has a past medical history of Sickle cell beta thalassemia ([facility] [address] Past Surgical History:   has a past surgical history that includes bronchoscopy (2019). Family History: family history is not on file. Social History:      Medications:    Reviewed in EPIC     Allergies:  Patient has no known allergies.     REVIEW OF SYSTEMS:      Review of Systems  Unobtainable              PHYSICAL EXAM:      /81   Pulse 128   Temp 100.2 °F (37.9 °C) (Bladder)   Resp 28   Ht 5' 6\" (1.676 m)   Wt 203 lb 11.3 oz (92.4 kg)   SpO2 100%   BMI 32.88 kg/m²    Temp (24hrs), Av.4 °F (38 °C), Min:100 °F (37.8 °C), Max:100.9 °F (38.3

## 2019-08-23 NOTE — PROGRESS NOTES
Critical Care Team - Daily Progress Note      Date and time: 8/23/2019 1:45 PM  Patient's name:  Vanessa Ackerman Record Number: [id number]  Patient's account/billing number: [de-identified]  Patient's YOB: 1985  Age: 29 y.o. Date of Admission: 8/18/2019 10:32 PM  Length of stay during current admission: 4      Primary Care Physician: No primary care provider on file. ICU Attending Physician: Dr. Ruiz Plain Status: Full Code    Reason for ICU admission:     Chief Complaint   Patient presents with    Fatigue     Tx from [facility]. Not feeling well since Thursday.  Shortness of Breath     on 4L O2       Subjective:     OVERNIGHT EVENTS:           Patient is seen and examined. No acute events overnight. Blood pressure remains elevated. Platelet count improved 89-> 113  And output 3.1 L in last 24 hours. Bilirubin improved 1.81-->1.55  CXR showed improvement. Hb 7.7. Retic count 2.4%. Currently on Fio2-30%  On PRVC- Fio2-30/RR 20/ TV 13/ PEEP 8.            AWAKE & FOLLOWING COMMANDS:  [x] No   [] Yes    CURRENT VENTILATION STATUS:     [x] Ventilator  [] BIPAP  [] Nasal Cannula [] Room Air      IF INTUBATED, ET TUBE MARKING AT LOWER LIP:       cms    SECRETIONS Amount:  [x] Small [] Moderate  [] Large  [] None  Color:     [] White [] Colored  [x] Bloody    SEDATION:  RAAS Score:  [] Propofol gtt  [x] Versed gtt  [] Ativan gtt   [] No Sedation    PARALYZED:  [x] No    [] Yes    DIARRHEA:                [x] No                [] Yes  (C. Difficile status: [] positive                                                                                                                       [] negative                                                                                                                     [] pending)    VASOPRESSORS:  [x] No    [] Yes    If yes -   [] Levophed       [] Dopamine     [] Vasopressin       [] Dobutamine  [] Phenylephrine         [] Epinephrine    CENTRAL have reviewed the chart, events noted from yesterday and overnight. I have reviewed the labs, ventilator setting reviewed  Her creatinine is normal elect lites okay blood sugar is high in 200s, LDH is 916 and is staying up and stable, AST ALT slightly elevated and bilirubin is improving 1.55, WBC count is 10.1 hemoglobin 7.7 and it is stable and platelet count is improving and improved 213 today reticulocyte count is improving with 2.4%. She is arousable and off sedation and on fentanyl drip at 75 mcg an hour she was hypertensive with blood pressure of 1 60-1 80 and she was tachycardic, urine output is good. Bleeding from airway is much better there is no fresh blood and it is all now clotted or blood on suctioning. Parvovirus IgM and IgG G both are less than 0.89 within normal limit. Will continue with ventilator support she is on a PEEP of 10 and FiO2 30% will decrease and wean PEEP to 8 and then 5. Continue to monitor bleeding from airway and continue to monitor platelet count. Transfusion as needed. Will start her on propofol drip. Will use labetalol for hypertension. Likely will start spontaneous breathing trial from tomorrow once PEEP is been to 5 and there is no more airway bleeding. We will continue to follow-up with hematology.     Discussed with nursing staff and respiratory therapist     Total critical care time caring for this patient with life threatening, unstable organ failure, including direct patient contact, management of life support systems, review of data including imaging and labs, discussions with other team members and physicians at least 27  Min so far today, excluding procedures.     Please note that this chart was generated using voice recognition Dragon dictation software.  Although every effort was made to ensure the accuracy of this automated transcription, some errors in transcription may have occurred.  Esperanza Roth MD  8/23/2019 2:25 PM

## 2019-08-23 NOTE — PLAN OF CARE
Problem: Airway Clearance - Ineffective:  Goal: Ability to maintain a clear airway will improve  Description  Ability to maintain a clear airway will improve  8/22/2019 2117 by Mike Curry RN  Outcome: Ongoing  8/22/2019 1736 by Greenwood Flatterrence  Outcome: Ongoing     Problem: Anxiety/Stress:  Goal: Level of anxiety will decrease  Description  Level of anxiety will decrease  8/22/2019 2117 by Mike Curry RN  Outcome: Ongoing  8/22/2019 1736 by Greenwood Flatterrence  Outcome: Ongoing     Problem: Aspiration:  Goal: Absence of aspiration  Description  Absence of aspiration  8/22/2019 2117 by Mike Curry RN  Outcome: Ongoing  8/22/2019 1736 by Spencer James  Outcome: Ongoing     Problem: Fluid Volume - Imbalance:  Goal: Absence of imbalanced fluid volume signs and symptoms  Description  Absence of imbalanced fluid volume signs and symptoms  8/22/2019 2117 by Mike Curry RN  Outcome: Ongoing  8/22/2019 1736 by Greenwood Jacob  Outcome: Ongoing     Problem: Gas Exchange - Impaired:  Goal: Levels of oxygenation will improve  Description  Levels of oxygenation will improve  8/22/2019 2117 by Mike Curry RN  Outcome: Ongoing  8/22/2019 1736 by Spencer James  Outcome: Ongoing     Problem: Mental Status - Impaired:  Goal: Mental status will be restored to baseline  Description  Mental status will be restored to baseline  8/22/2019 2117 by Mike Curry RN  Outcome: Ongoing  8/22/2019 1736 by Greenwood Jacob  Outcome: Ongoing     Problem: Tissue Perfusion, Altered:  Goal: Circulatory function within specified parameters  Description  Circulatory function within specified parameters  8/22/2019 2117 by Mike Curry RN  Outcome: Ongoing  8/22/2019 1736 by Greenwood Jacob  Outcome: Ongoing     Problem:  Activity:  Goal: Fatigue will decrease  Description  Fatigue will decrease  8/22/2019 2117 by Mike Curry RN  Outcome: Ongoing  8/22/2019 1736 by Spencer James  Outcome: MECHANICAL VENTILATION  Goal: Patient will maintain patent airway  8/22/2019 2117 by Adelfo Estevez RN  Outcome: Ongoing  8/22/2019 1736 by Leonel Aguirre  Outcome: Ongoing  8/22/2019 0929 by Ariella Six, RCP  Outcome: Ongoing  8/22/2019 0928 by Ariella Six, RCP  Outcome: Ongoing  Goal: Oral health is maintained or improved  8/22/2019 2117 by Adelfo Estevez RN  Outcome: Ongoing  8/22/2019 1736 by Leonel Aguirre  Outcome: Ongoing  8/22/2019 0929 by Ariella Steve, RCP  Outcome: Ongoing  8/22/2019 0928 by Ariella Six, RCP  Outcome: Ongoing  Goal: ET tube will be managed safely  8/22/2019 2117 by Adelfo Estevez RN  Outcome: Ongoing  8/22/2019 1736 by Leonel Aguirre  Outcome: Ongoing  8/22/2019 0929 by Ariella Six, RCP  Outcome: Ongoing  8/22/2019 0928 by Ariella Six, RCP  Outcome: Ongoing  Goal: Ability to express needs and understand communication  8/22/2019 2117 by Adelfo Estevez RN  Outcome: Ongoing  8/22/2019 1736 by Leonle Aguirre  Outcome: Ongoing  8/22/2019 0929 by Ariella Six, RCP  Outcome: Ongoing  8/22/2019 0928 by Ariella Six, RCP  Outcome: Ongoing  Goal: Mobility/activity is maintained at optimum level for patient  8/22/2019 2117 by Adelfo Estevez RN  Outcome: Ongoing  8/22/2019 1736 by Leonel Aguirre  Outcome: Ongoing  8/22/2019 0928 by Ariella Wilson, RCP  Outcome: Ongoing     Problem: SKIN INTEGRITY  Goal: Skin integrity is maintained or improved  8/22/2019 2117 by Adelfo Estevez RN  Outcome: Ongoing  8/22/2019 1736 by Leonel Aguirre  Outcome: Ongoing     Problem: Falls - Risk of:  Goal: Will remain free from falls  Description  Will remain free from falls  8/22/2019 2117 by Adelfo Estevez RN  Outcome: Ongoing  8/22/2019 1736 by Leonel Aguirre  Outcome: Ongoing  Goal: Absence of physical injury  Description  Absence of physical injury  8/22/2019 2117 by Adelfo Estevez RN  Outcome: Ongoing  8/22/2019 1736 by [facility]  Outcome: Ongoing

## 2019-08-23 NOTE — PROGRESS NOTES
[facility] - Progress Note    Today's Date and Time: 8/23/2019, 11:41 AM    Impression :   · Sickle cell crisis  · Aplastic crisis  · Severe Mycoplasma pneumoniae pneumonia vs ARDS  · Microcytic anemia  · Thrombocytopenia  · Bleeding per respiratory tract    Recommendations:   · Continue Levaquin 750 mg IV q 24 hr    Medical Decision Making/Summary/Discussion:8/23/2019     · Patient with underlying sickle cell and beta thalassemia  · Developed respiratory infection with extensive pulmonary involvement  · Has required intubation because of hypoxic respiratory failure  · Serologic tests show positive IgM Mycoplasma antibody  · CBC shows pancytopenia, possible aplastic crisis  · Plan to treat with IV Levaquin pending exclusion of any other associated pathogens. If none levaquin dose can be subsequently decreased to 500 mg/day as Mycoplasma have very low MICs to quinolones. · Suggest cold agglutinins to exclude cold agglutinin hemolytic anemia, exclude Parvovirus B 19 co-infection. · 8-22-19 Patient remains intubated. FI02 45% PEEP 10, secretions bloody. BP stable, off pressors. · 8-23-19. Patient improving. CXR has started to clear. Infection Control Recommendations   · Universal Precautions  · Droplet isolation    Antimicrobial Stewardship Recommendations     · Targeted therapy  Coordination of Outpatient Care:   · Estimated Length of IV antimicrobials: 8-29-19  · Patient will need Midline Catheter Insertion: Yes  · Patient will need PICC line Insertion:No  · Patient will need: Home IV , [place],  SNF,  LTAC:TBD  · Patient will need outpatient wound care:No    Chief complaint/reason for consultation:   · Sickle cell crisis and possible community acquired pneumonia vs ARDS      History of Present Illness:   Talya Duncan is a 29y.o.-year-old  female who was initially admitted on 8/18/2019.      INITIAL EVALUATION:     Pt is a 29year old female with a past history of sickle cell anemia and beta thalasemia who presented to [facility] ED transferred from an outside hospital on 8/18/19 because of generalized chils, pain, malaise and non productive cough. Pt lives in [place] and had been in [place] for [employer] this past week. On 8/14/19 she started to experience generalized chills, pain, and weakness in her legs. On 8/18/19 as she left [place] she developed nonproductive cough, increased generalized pain and weakness, and increased malaise. EMS was called and taken to [facility]. Pt was then transferred to [facility] because of tachycardia and tachypenia. Upon arrival to [facility] the patient was placed on nonrebreather mask due to low O2 saturations. She was then switched to BiPAP. Her Hb had dropped to 5.5, she was given 1 unit of PRBC. Of note, Pt is from [place] originally and had told the ICU resident that she was diagnosed with sickle cell and beta thalassemia in [place] and was treated with Folic acid. Her last crisis was more than 10 years ago. Upon examination today, currently transitioned to intubation this afternoon after decreased sats. She was on BiPAP earlier this morning. The patient was very drowsy this morning and unable to give history or follow commands. She was able to weakly hand squeeze BL and move her toes BLE. Nurse noted that she was more awake earlier when she was on the nonrebreather mask. Pt does awake briefly for painful stimuli. Pt has increased heart rate and increased respiratory rate, along with O2 at 100%. ICU concerned for multifocal pneumonia vs ARDS  Currently on Levaquin only. CURRENT EVALUATION: 8/23/2019    Patient evaluated and examined in the ICU. Pt remains intubated. Patient is currently on droplet precautions. Patient has continued to have some pulmonary bleeding but in lesser amounts.     Afebrile  VS stable  Remains on ventilator FI02 50--45-->30%, PEEP 10-->8, not weaning  CXR 8-22-19: No

## 2019-08-23 NOTE — PLAN OF CARE
Problem: RESPIRATORY  Intervention: Respiratory assessment  Note:   Ventilator Bronchodilator assessment    No issue with wheezing. No pulm history documented. Currently on Xoponex. Order Dr. Courtney Hall to change to Albuterol   Will change to as needed. ARDS re solved. Good lung compliance    Breath sounds: clear  Inspiratory Pressure: 23  Plateau Pressure: 22    Patient assessed at level 1          ? Bronchodilator Assessment    BRONCHODILATOR ASSESSMENT SCORE  Score 0 (Home) 1 2 3 4   Breath Sounds   ? Chronic Ventilator: Patient at baseline ? Mild Wheezes/ Clear ? Intermittent wheezes with good air entry ? Bilateral/unilateral wheezing with diminished air entry ? Insp/Exp wheeze and/or poor aeration   Ventilator Pressures   ? Chronic Ventilator ? Insp. Pressure less than 25 cm H20 ? Insp. Pressure less than 25 cm H20 ? Insp. Pressure exceeds 25 cm H20 ? Insp. Pressure exceeds 30 cm H20   Plateau Pressure ? NA   ? Plateau Pressure less than 4  ? Plateau Pressure less than or equal to 5 ? Plateau Pressure greater than or equal to 6 ?   Plateau Pressure greater than or equal to 8       MAR REILLY  10:01 AM

## 2019-08-23 NOTE — PLAN OF CARE
Problem: Gas Exchange - Impaired:  Intervention: Assess signs and symptoms of impaired gas exchange  Note:   Currently on Peep of 10  Pulmonary Hemrhogage resolved. Order per Dr. Luca Nath to decrease to 8 now. Hold daily SBT's for now. Issue with uncontrolled HTN.

## 2019-08-23 NOTE — PROGRESS NOTES
 levofloxacin  750 mg Intravenous Q24H       Social History:     Social History     Socioeconomic History    Marital status:      Spouse name: Not on file    Number of children: Not on file    Years of education: Not on file    Highest education level: Not on file   Occupational History    Not on file   Social Needs    Financial resource strain: Not on file    Food insecurity:     Worry: Not on file     Inability: Not on file    Transportation needs:     Medical: Not on file     Non-medical: Not on file   Tobacco Use    Smoking status: Not on file   Substance and Sexual Activity    Alcohol use: Not on file    Drug use: Not on file    Sexual activity: Not on file   Lifestyle    Physical activity:     Days per week: Not on file     Minutes per session: Not on file    Stress: Not on file   Relationships    Social connections:     Talks on phone: Not on file     Gets together: Not on file     Attends [employer] service: Not on file     Active member of club or organization: Not on file     Attends meetings of clubs or organizations: Not on file     Relationship status: Not on file    Intimate partner violence:     Fear of current or ex partner: Not on file     Emotionally abused: Not on file     Physically abused: Not on file     Forced sexual activity: Not on file   Other Topics Concern    Not on file   Social History Narrative    Not on file       Family History:   No family history on file. Allergies:   Patient has no known allergies. Review of Systems:   Constitutional: Currently on BiPAP, not responding very well, drowsy  Head: No headaches  Eyes: No double vision or blurry vision. No conjunctival inflammation. ENT: No sore throat or runny nose. . No hearing loss, tinnitus or vertigo. Cardiovascular: No chest pain or palpitations. No shortness of breath. No GONG  Lung: No shortness of breath or cough. No sputum production  Abdomen: No nausea, vomiting, diarrhea, or abdominal pain. [facility] caliber.       Mediastinum: No evidence of mediastinal lymphadenopathy.  The heart and   pericardium demonstrate no acute abnormality.  There is no acute abnormality   of the thoracic aorta.       Lungs/pleura: There is extensive bilateral symmetric multifocal airspace   disease throughout the lungs.  Fine lung parenchyma is obscured by the   extensive airspace disease and respiratory motion.  There is no pneumothorax   or pleural fluid.       Upper Abdomen: Splenomegaly is suspected.  The spleen was not completely   visualized on this examination.       Soft Tissues/Bones: No acute bone or soft tissue abnormality.           Impression   No gross evidence of a pulmonary embolus on this motion degraded study.       There is extensive multifocal airspace disease throughout the lungs. Extensive pneumonia is suspected.  Pulmonary edema and ARDS not excluded.         EXAMINATION:   ONE XRAY VIEW OF THE CHEST       8/19/2019 3:01 am       COMPARISON:   CT chest done 08/18/2019.       HISTORY:   ORDERING SYSTEM PROVIDED HISTORY: SOB   TECHNOLOGIST PROVIDED HISTORY:   SOB   Reason for Exam: Short of breath.       FINDINGS:   Frontal portable view of the chest.  Diffuse right greater than left   heterogeneous opacities.  Normal lung volume.  No pleural effusion or   pneumothorax.  No cardiomegaly.  Enlarged main pulmonary artery.  No acute   osseous abnormality.           Impression   Diffuse right greater than left heterogeneous opacities.  Differential   considerations include pulmonary edema, ARDS and pneumonia.      EXAMINATION:   ONE XRAY VIEW OF THE CHEST       8/20/2019 9:50 am       COMPARISON:   08/19/2019       HISTORY:   ORDERING SYSTEM PROVIDED HISTORY: intubated   TECHNOLOGIST PROVIDED HISTORY:   intubated       FINDINGS:   The endotracheal tube measures 3.2 cm from the penelope.  Cardiac silhouette   and mediastinal structures appear stable.  Diffuse increased interstitial and   alveolar opacities are identified bilaterally with bilateral pleural   effusions, with mild worsening from the previous examination.  Osseous   structures appear stable.           Impression   Worsening interstitial and alveolar opacities with bilateral pleural   effusions. Medical Decision Eaiggr-Bogdjnvz-Boqap:     NASOPHARYNGEAL SWAB         Special Requests NOT REPORTED      Culture NEGATIVE: HSV-1 DNA not detected by nucleic acid amplification.       NEGATIVE: HSV-2 DNA not detected by nucleic acid amplification.       Due to the specimen source, HSV 1,2 testing was performed by a molecular method.       NEGATIVE for Influenza A and B, Para influenza 1,2,3, Adenovirus and RSV. at day 1       Negative results do not preclude respiratory virus infection and should not be used as the sole basis for diagnosis, treatment or other management decisions.       NEGATIVE for Cytomegalovirus at day 1       8/20/2019  3:28 PM - Raul, [facility] Incoming Lab Results From [facility]     Component Collected Lab   Surgical Pathology Report 08/19/2019  3:20  [address],  [place] [address]. [place], [address]   [phone]   Fax: [phone]      [facility]       Patient Name: Maylin Oroscol: [id number]   Path Number: [id number]   Collected: 8/19/2019   Received: 8/19/2019   Reported: 8/20/2019 15:27     -- Diagnosis --   RIGHT LUNG, MIDDLE AND LOWER LOBES, BRONCHOALVEOLAR LAVAGE:   - BLOODY SAMPLE, CONSISTENT WITH INTRA-ALVEOLAR HEMORRHAGE.   - NEGATIVE FOR VIRAL INCLUSIONS, YEAST/FUNGI, PNEUMOCYSTIS,   SIDEROPHAGES    AND MALIGNANCY.        David Ramsey Och,   **Electronically Signed Out**         tb1/8/20/2019         Clinical Information   Pre-op Diagnosis:  BLEEDING AFTER INTUBATION   Operative Findings:  RIGHT MIDDLE LOBE, RIGHT LOWER LOBE BAL, STAT   IMMUNOCOMPROMISED   Operation Performed:  BRONCHOSCOPY     Source of Specimen   1: RIGHT MIDDLE LOBE, RIGHT LOWER LOBE BAL        Medical Decision Making-Other:     Note:  · Labs, medications, radiologic studies were reviewed with personal review of films  · Moderate Large amounts of data were reviewed  · Discussed with nursing Staff, Discharge planner  · Infection Control and Prevention measures reviewed  · All prior entries were reviewed  · Administer medications as ordered  · Prognosis: Guarded  · Discharge planning reviewed  · Follow up as outpatient. Thank you for allowing us to participate in the care of this patient. Please call with questions. Katiana Lassiter DPM PGY1  Seen with Dr. Ivette Grier:    I have discussed the case, including pertinent history and exam findings with the residents. I have seen and examined the patient and the key elements of the encounter have been performed by me. I have reviewed the laboratory data, other diagnostic studies and discussed them with the residents. I have updated the medical record where necessary. I agree with the assessment, plan and orders as documented by the resident.     Ramone Baker MD.    Pager: [phone] - Office: [phone]

## 2019-08-24 ENCOUNTER — APPOINTMENT (OUTPATIENT)
Dept: GENERAL RADIOLOGY | Age: 34
DRG: 207 | End: 2019-08-24
Payer: COMMERCIAL

## 2019-08-24 LAB
ABSOLUTE EOS #: 0 K/UL (ref 0–0.4)
ABSOLUTE IMMATURE GRANULOCYTE: 0.41 K/UL (ref 0–0.3)
ABSOLUTE LYMPH #: 0.82 K/UL (ref 1–4.8)
ABSOLUTE MONO #: 0.52 K/UL (ref 0.1–0.8)
ABSOLUTE RETIC #: 0.12 M/UL (ref 0.03–0.08)
ALBUMIN SERPL-MCNC: 2.9 G/DL (ref 3.5–5.2)
ALBUMIN/GLOBULIN RATIO: 0.9 (ref 1–2.5)
ALLEN TEST: ABNORMAL
ALP BLD-CCNC: 90 U/L (ref 35–104)
ALT SERPL-CCNC: 72 U/L (ref 5–33)
ANION GAP SERPL CALCULATED.3IONS-SCNC: 12 MMOL/L (ref 9–17)
AST SERPL-CCNC: 58 U/L
BASOPHILS # BLD: 0 % (ref 0–2)
BASOPHILS ABSOLUTE: 0 K/UL (ref 0–0.2)
BILIRUB SERPL-MCNC: 1.49 MG/DL (ref 0.3–1.2)
BILIRUBIN DIRECT: 0.47 MG/DL
BILIRUBIN, INDIRECT: 1.02 MG/DL (ref 0–1)
BUN BLDV-MCNC: 18 MG/DL (ref 6–20)
BUN/CREAT BLD: ABNORMAL (ref 9–20)
CALCIUM SERPL-MCNC: 8.9 MG/DL (ref 8.6–10.4)
CHLORIDE BLD-SCNC: 107 MMOL/L (ref 98–107)
CO2: 28 MMOL/L (ref 20–31)
CREAT SERPL-MCNC: 0.36 MG/DL (ref 0.5–0.9)
CULTURE: NORMAL
DIFFERENTIAL TYPE: ABNORMAL
EOSINOPHILS RELATIVE PERCENT: 0 % (ref 1–4)
FIO2: 30
GFR AFRICAN AMERICAN: >60 ML/MIN
GFR NON-AFRICAN AMERICAN: >60 ML/MIN
GFR SERPL CREATININE-BSD FRML MDRD: ABNORMAL ML/MIN/{1.73_M2}
GFR SERPL CREATININE-BSD FRML MDRD: ABNORMAL ML/MIN/{1.73_M2}
GLOBULIN: ABNORMAL G/DL (ref 1.5–3.8)
GLUCOSE BLD-MCNC: 192 MG/DL (ref 65–105)
GLUCOSE BLD-MCNC: 208 MG/DL (ref 65–105)
GLUCOSE BLD-MCNC: 281 MG/DL (ref 65–105)
GLUCOSE BLD-MCNC: 285 MG/DL (ref 70–99)
GLUCOSE BLD-MCNC: 288 MG/DL (ref 65–105)
HCT VFR BLD CALC: 25.2 % (ref 36.3–47.1)
HEMOGLOBIN: 8 G/DL (ref 11.9–15.1)
IMMATURE GRANULOCYTES: 4 %
IMMATURE RETIC FRACT: 43.2 % (ref 2.7–18.3)
LACTATE DEHYDROGENASE: 999 U/L (ref 135–214)
LYMPHOCYTES # BLD: 8 % (ref 24–44)
Lab: NORMAL
MCH RBC QN AUTO: 26.7 PG (ref 25.2–33.5)
MCHC RBC AUTO-ENTMCNC: 31.7 G/DL (ref 28.4–34.8)
MCV RBC AUTO: 84 FL (ref 82.6–102.9)
MISCELLANEOUS LAB TEST RESULT: NORMAL
MODE: ABNORMAL
MONOCYTES # BLD: 5 % (ref 1–7)
MORPHOLOGY: ABNORMAL
MORPHOLOGY: ABNORMAL
NEGATIVE BASE EXCESS, ART: ABNORMAL (ref 0–2)
NRBC AUTOMATED: 155 PER 100 WBC
NUCLEATED RED BLOOD CELLS: 155 PER 100 WBC
O2 DEVICE/FLOW/%: ABNORMAL
PATIENT TEMP: ABNORMAL
PDW BLD-RTO: 20.5 % (ref 11.8–14.4)
PLATELET # BLD: 167 K/UL (ref 138–453)
PLATELET ESTIMATE: ABNORMAL
PMV BLD AUTO: 11.4 FL (ref 8.1–13.5)
POC HCO3: 32.5 MMOL/L (ref 21–28)
POC O2 SATURATION: 97 % (ref 94–98)
POC PCO2 TEMP: ABNORMAL MM HG
POC PCO2: 45.8 MM HG (ref 35–48)
POC PH TEMP: ABNORMAL
POC PH: 7.46 (ref 7.35–7.45)
POC PO2 TEMP: ABNORMAL MM HG
POC PO2: 82.9 MM HG (ref 83–108)
POSITIVE BASE EXCESS, ART: 8 (ref 0–3)
POTASSIUM SERPL-SCNC: 4.1 MMOL/L (ref 3.7–5.3)
RBC # BLD: 3 M/UL (ref 3.95–5.11)
RBC # BLD: ABNORMAL 10*6/UL
RETIC %: 4.1 % (ref 0.5–1.9)
RETIC HEMOGLOBIN: 23.1 PG (ref 28.2–35.7)
SAMPLE SITE: ABNORMAL
SEG NEUTROPHILS: 83 % (ref 36–66)
SEGMENTED NEUTROPHILS ABSOLUTE COUNT: 8.55 K/UL (ref 1.8–7.7)
SODIUM BLD-SCNC: 147 MMOL/L (ref 135–144)
SPECIMEN DESCRIPTION: NORMAL
TCO2 (CALC), ART: 34 MMOL/L (ref 22–29)
TEST NAME: NORMAL
TOTAL PROTEIN: 6.3 G/DL (ref 6.4–8.3)
WBC # BLD: 10.3 K/UL (ref 3.5–11.3)
WBC # BLD: ABNORMAL 10*3/UL

## 2019-08-24 PROCEDURE — 6360000002 HC RX W HCPCS: Performed by: STUDENT IN AN ORGANIZED HEALTH CARE EDUCATION/TRAINING PROGRAM

## 2019-08-24 PROCEDURE — 82947 ASSAY GLUCOSE BLOOD QUANT: CPT

## 2019-08-24 PROCEDURE — 94681 O2 UPTK CO2 OUTP % O2 XTRC: CPT

## 2019-08-24 PROCEDURE — 99291 CRITICAL CARE FIRST HOUR: CPT | Performed by: INTERNAL MEDICINE

## 2019-08-24 PROCEDURE — 37799 UNLISTED PX VASCULAR SURGERY: CPT

## 2019-08-24 PROCEDURE — 6370000000 HC RX 637 (ALT 250 FOR IP): Performed by: INTERNAL MEDICINE

## 2019-08-24 PROCEDURE — 6370000000 HC RX 637 (ALT 250 FOR IP): Performed by: STUDENT IN AN ORGANIZED HEALTH CARE EDUCATION/TRAINING PROGRAM

## 2019-08-24 PROCEDURE — 71045 X-RAY EXAM CHEST 1 VIEW: CPT

## 2019-08-24 PROCEDURE — 2500000003 HC RX 250 WO HCPCS: Performed by: STUDENT IN AN ORGANIZED HEALTH CARE EDUCATION/TRAINING PROGRAM

## 2019-08-24 PROCEDURE — 85045 AUTOMATED RETICULOCYTE COUNT: CPT

## 2019-08-24 PROCEDURE — 80048 BASIC METABOLIC PNL TOTAL CA: CPT

## 2019-08-24 PROCEDURE — 2000000000 HC ICU R&B

## 2019-08-24 PROCEDURE — 99233 SBSQ HOSP IP/OBS HIGH 50: CPT | Performed by: INTERNAL MEDICINE

## 2019-08-24 PROCEDURE — 82803 BLOOD GASES ANY COMBINATION: CPT

## 2019-08-24 PROCEDURE — 85025 COMPLETE CBC W/AUTO DIFF WBC: CPT

## 2019-08-24 PROCEDURE — 83615 LACTATE (LD) (LDH) ENZYME: CPT

## 2019-08-24 PROCEDURE — 2700000000 HC OXYGEN THERAPY PER DAY

## 2019-08-24 PROCEDURE — 83036 HEMOGLOBIN GLYCOSYLATED A1C: CPT

## 2019-08-24 PROCEDURE — 2580000003 HC RX 258: Performed by: STUDENT IN AN ORGANIZED HEALTH CARE EDUCATION/TRAINING PROGRAM

## 2019-08-24 PROCEDURE — 94003 VENT MGMT INPAT SUBQ DAY: CPT

## 2019-08-24 PROCEDURE — 36415 COLL VENOUS BLD VENIPUNCTURE: CPT

## 2019-08-24 PROCEDURE — 80076 HEPATIC FUNCTION PANEL: CPT

## 2019-08-24 PROCEDURE — 99232 SBSQ HOSP IP/OBS MODERATE 35: CPT | Performed by: INTERNAL MEDICINE

## 2019-08-24 RX ORDER — HYDROCHLOROTHIAZIDE 25 MG/1
25 TABLET ORAL DAILY
Status: DISCONTINUED | OUTPATIENT
Start: 2019-08-24 | End: 2019-08-24

## 2019-08-24 RX ORDER — AMLODIPINE BESYLATE 10 MG/1
10 TABLET ORAL DAILY
Status: DISCONTINUED | OUTPATIENT
Start: 2019-08-24 | End: 2019-08-28 | Stop reason: HOSPADM

## 2019-08-24 RX ORDER — HYDRALAZINE HYDROCHLORIDE 20 MG/ML
20 INJECTION INTRAMUSCULAR; INTRAVENOUS ONCE
Status: COMPLETED | OUTPATIENT
Start: 2019-08-24 | End: 2019-08-24

## 2019-08-24 RX ORDER — METHYLPREDNISOLONE SODIUM SUCCINATE 40 MG/ML
40 INJECTION, POWDER, LYOPHILIZED, FOR SOLUTION INTRAMUSCULAR; INTRAVENOUS EVERY 12 HOURS
Status: DISCONTINUED | OUTPATIENT
Start: 2019-08-24 | End: 2019-08-26

## 2019-08-24 RX ADMIN — Medication 10 MG: at 03:55

## 2019-08-24 RX ADMIN — INSULIN LISPRO 6 UNITS: 100 INJECTION, SOLUTION INTRAVENOUS; SUBCUTANEOUS at 04:30

## 2019-08-24 RX ADMIN — SODIUM CHLORIDE: 4.5 INJECTION, SOLUTION INTRAVENOUS at 05:51

## 2019-08-24 RX ADMIN — LEVOFLOXACIN 750 MG: 5 INJECTION, SOLUTION INTRAVENOUS at 09:17

## 2019-08-24 RX ADMIN — HYDRALAZINE HYDROCHLORIDE 20 MG: 20 INJECTION INTRAMUSCULAR; INTRAVENOUS at 23:07

## 2019-08-24 RX ADMIN — METHYLPREDNISOLONE SODIUM SUCCINATE 40 MG: 40 INJECTION, POWDER, FOR SOLUTION INTRAMUSCULAR; INTRAVENOUS at 17:38

## 2019-08-24 RX ADMIN — Medication 10 MG: at 13:19

## 2019-08-24 RX ADMIN — Medication 15 ML: at 09:20

## 2019-08-24 RX ADMIN — METHYLPREDNISOLONE SODIUM SUCCINATE 40 MG: 40 INJECTION, POWDER, FOR SOLUTION INTRAMUSCULAR; INTRAVENOUS at 05:52

## 2019-08-24 RX ADMIN — PROPOFOL 25 MCG/KG/MIN: 10 INJECTION, EMULSION INTRAVENOUS at 08:21

## 2019-08-24 RX ADMIN — INSULIN LISPRO 6 UNITS: 100 INJECTION, SOLUTION INTRAVENOUS; SUBCUTANEOUS at 11:36

## 2019-08-24 RX ADMIN — FAMOTIDINE 20 MG: 20 TABLET, FILM COATED ORAL at 08:26

## 2019-08-24 RX ADMIN — SODIUM CHLORIDE, PRESERVATIVE FREE 10 ML: 5 INJECTION INTRAVENOUS at 09:19

## 2019-08-24 RX ADMIN — INSULIN LISPRO 2 UNITS: 100 INJECTION, SOLUTION INTRAVENOUS; SUBCUTANEOUS at 23:07

## 2019-08-24 RX ADMIN — Medication 10 MG: at 08:16

## 2019-08-24 RX ADMIN — INSULIN LISPRO 4 UNITS: 100 INJECTION, SOLUTION INTRAVENOUS; SUBCUTANEOUS at 17:36

## 2019-08-24 RX ADMIN — FOLIC ACID 1 MG: 1 TABLET ORAL at 08:27

## 2019-08-24 RX ADMIN — Medication 10 MG: at 20:51

## 2019-08-24 RX ADMIN — AMLODIPINE BESYLATE 10 MG: 10 TABLET ORAL at 08:26

## 2019-08-24 RX ADMIN — PROPOFOL 25 MCG/KG/MIN: 10 INJECTION, EMULSION INTRAVENOUS at 01:46

## 2019-08-24 RX ADMIN — SODIUM CHLORIDE, PRESERVATIVE FREE 10 ML: 5 INJECTION INTRAVENOUS at 19:57

## 2019-08-24 RX ADMIN — HYDRALAZINE HYDROCHLORIDE 10 MG: 20 INJECTION INTRAMUSCULAR; INTRAVENOUS at 09:28

## 2019-08-24 RX ADMIN — HYDROCHLOROTHIAZIDE 25 MG: 25 TABLET ORAL at 08:27

## 2019-08-24 RX ADMIN — FAMOTIDINE 20 MG: 20 TABLET, FILM COATED ORAL at 19:59

## 2019-08-24 RX ADMIN — Medication 10 MG: at 18:10

## 2019-08-24 RX ADMIN — Medication 100 MCG/HR: at 03:02

## 2019-08-24 ASSESSMENT — PULMONARY FUNCTION TESTS
PIF_VALUE: 14
PIF_VALUE: 25
PIF_VALUE: 22
PIF_VALUE: 20
PIF_VALUE: 14
PIF_VALUE: 22
PIF_VALUE: 13

## 2019-08-24 ASSESSMENT — PAIN SCALES - GENERAL
PAINLEVEL_OUTOF10: 0

## 2019-08-24 NOTE — PROGRESS NOTES
thalassemia ([facility]    Sickle cell crisis ([facility]    Pneumonia of both lungs due to Mycoplasma pneumoniae    On mechanically assisted ventilation (HCC)    Pancytopenia (HCC)    ARDS (adult respiratory distress syndrome) (HCC)    Pneumonia of both lungs due to infectious organism    Thrombocytopenia (HCC)    Diffuse pulmonary alveolar hemorrhage    Massive hemoptysis    Aplastic crisis ([facility]  Resolved Problems:    * No resolved hospital problems. *        PLAN:     1. Acute respiratory failure, suspected mycoplasma /ARDS. Extubated today. 2.  ID on board. Continue Levaquin 750 daily- day 6. WBC 10.3   3. Pulmonary hemorrhage, suspected mycoplasma pneumonia versus ARDS. On Solumedrol 40 mg q6. Will change it to 40 mg q12.   4. Suspected aplastic crisis. Pl counts 113->167. Maintain hemoglobin above 7. Minimize blood draws. Heme-onc following, recommend keeping platelet counts above 30,000; transfuse as needed. 5. Mildly positive MIKE and low titer anti-DNA. Normal complement levels. Rheumatology consulted, have a low suspicion for SLE. 6. History of sickle cell anemia and beta thalassemia trait. Confirmed by hemoglobin electrophoresis. We will try to obtain medical records from her home state about any ongoing treatment. ECHo showed EF 50 %. 7. DVT prophylaxis. We will hold as the patient is actively bleeding and thrombocytopenic. 8. PT OT  9. Disposition. Remain in ICU      Kemar Roberts M.D. Department of Internal Medicine/ Critical care  [facility])             8/24/2019, 1:31 PM       Attending Physician Statement  I have discussed the care of Quinton Parekh, including pertinent history and exam findings with the resident. I have reviewed the key elements of all parts of the encounter with the resident. I have seen and examined the patient with the resident. I agree with the assessment and plan and status of the problem list as documented.   I seen the patient during wound today, I have reviewed the chart, labs and medications reviewed. She is afebrile, WBC count is normal, hemoglobin is stable and platelet count continue to improve 167 hemoglobin was 8 and hematocrit 25.2, renal function remains okay blood sugar remains high since his steroid was started few days ago although she does have high blood sugar before that, LDH remains elevated 999 AST ALT is mildly elevated total bilirubin is improved as compared to 3 days ago. Reticulocyte count is improving  She is hypertensive also mostly since she started on steroids she does have history of hypertension but she is only on hydrochlorothiazide at home currently she is on Norvasc. This morning she was on propofol drip and also on fentanyl 100 mcg she was arousable off propofol follows command and look comfortable. On ventilator she was on a PEEP of 8 and her blood gases shows good PO2 FiO2 ratio with PO2 of 83 on 30% FiO2 PCO2 was 46, she has minimal amount of old blood on suctioning from endotracheal tube no fresh bleeding. Started on CPAP/pressure support this point his breathing trial which she tolerated well. We will discontinue propofol and will wean fentanyl drip. We will decrease Solu-Medrol dose to 40 every 12. Monitor hemoptysis. We will continue monitor platelet count reticulocyte count and LDH. Patient tolerated this point is breathing tried and extubated over the catheter, I was present during extubation, she did not have leak but when she was extubated over the [facility] catheter she has mild hoarseness no stridor and tolerated well oxygen saturation 97 to 99% on nasal cannula post extubation. Discussed with respiratory therapist.  Discussed with nursing staff.     Total critical care time caring for this patient with life threatening, unstable organ failure, including direct patient contact, management of life support systems, review of data including imaging and labs, discussions with

## 2019-08-24 NOTE — PLAN OF CARE
RN  Outcome: Ongoing  8/23/2019 0736 by Luther Bryant RN  Outcome: Ongoing  Goal: ET tube will be managed safely  8/23/2019 2119 by Nidhi Garner RN  Outcome: Ongoing  8/23/2019 0736 by Luther Bryant RN  Outcome: Ongoing  Goal: Ability to express needs and understand communication  8/23/2019 2119 by Nidhi Garner RN  Outcome: Ongoing  8/23/2019 0736 by Luther Bryant RN  Outcome: Ongoing  Goal: Mobility/activity is maintained at optimum level for patient  8/23/2019 2119 by Nidhi Garner RN  Outcome: Ongoing  8/23/2019 0736 by Luther Bryant RN  Outcome: Ongoing     Problem: SKIN INTEGRITY  Goal: Skin integrity is maintained or improved  8/23/2019 2119 by Nidhi Garner RN  Outcome: Ongoing  8/23/2019 0736 by Luther Bryant RN  Outcome: Ongoing     Problem: Falls - Risk of:  Goal: Will remain free from falls  Description  Will remain free from falls  8/23/2019 2119 by Nidhi Garner RN  Outcome: Ongoing  8/23/2019 0736 by Luther Bryant RN  Outcome: Ongoing  Goal: Absence of physical injury  Description  Absence of physical injury  8/23/2019 2119 by Nidhi Garner RN  Outcome: Ongoing  8/23/2019 0736 by Luther Bryant RN  Outcome: Ongoing     Problem: Risk for Impaired Skin Integrity  Goal: Tissue integrity - skin and mucous membranes  Description  Structural intactness and normal physiological function of skin and  mucous membranes.   8/23/2019 2119 by Nidhi Garner RN  Outcome: Ongoing  8/23/2019 0736 by Luther Bryant RN  Outcome: Ongoing     Problem: Musculor/Skeletal Functional Status  Goal: Highest potential functional level  8/23/2019 2119 by Nidhi Garner RN  Outcome: Ongoing  8/23/2019 0736 by Luther Bryant RN  Outcome: Ongoing

## 2019-08-24 NOTE — PLAN OF CARE
Problem: Airway Clearance - Ineffective:  Goal: Ability to maintain a clear airway will improve  Description  Ability to maintain a clear airway will improve  8/24/2019 0354 by Parminder Freitas RCP  Outcome: Ongoing     Problem: Gas Exchange - Impaired:  Goal: Levels of oxygenation will improve  Description  Levels of oxygenation will improve  8/24/2019 0354 by Parminder Freitas RCP  Outcome: Ongoing     Problem: OXYGENATION/RESPIRATORY FUNCTION  Goal: Patient will maintain patent airway  8/24/2019 0354 by Parminder Freitas RCP  Outcome: Ongoing     Problem: OXYGENATION/RESPIRATORY FUNCTION  Goal: Patient will achieve/maintain normal respiratory rate/effort  Description  Respiratory rate and effort will be within normal limits for the patient  8/24/2019 0354 by Parminder Freitas RCP  Outcome: Ongoing     Problem: MECHANICAL VENTILATION  Goal: Patient will maintain patent airway  8/24/2019 0354 by Parminder Freitas RCP  Outcome: Ongoing     Problem: MECHANICAL VENTILATION  Goal: Oral health is maintained or improved  8/24/2019 0354 by Parminder Freitas RCP  Outcome: Ongoing     Problem: MECHANICAL VENTILATION  Goal: ET tube will be managed safely  8/24/2019 0354 by Parminder Freitas RCP  Outcome: Ongoing     Problem: MECHANICAL VENTILATION  Goal: Ability to express needs and understand communication  8/24/2019 0354 by Parminder Freitas RCP  Outcome: Ongoing     Problem: MECHANICAL VENTILATION  Goal: Mobility/activity is maintained at optimum level for patient  8/24/2019 0354 by Parminder Freitas RCP  Outcome: Ongoing

## 2019-08-24 NOTE — PROGRESS NOTES
No GONG  Lung: No shortness of breath or cough. No sputum production  Abdomen: No nausea, vomiting, diarrhea, or abdominal pain. Minor Ronde No cramps. Genitourinary: No increased urinary frequency, or dysuria. No hematuria. No suprapubic or CVA pain  Musculoskeletal: Weakness BUE and BLE  Hematologic: No bleeding or bruising. Neurologic: No headache, weakness, numbness, or tingling. Integument: No rash, no ulcers. Psychiatric: No depression. Endocrine: No polyuria, no polydipsia, no polyphagia. Physical Examination :     Patient Vitals for the past 8 hrs:   BP Temp Temp src Pulse Resp SpO2   08/24/19 0938 -- -- -- 102 23 --   08/24/19 0900 -- 99.9 °F (37.7 °C) CORE -- -- --   08/24/19 0847 -- -- -- -- -- 96 %   08/24/19 0846 -- -- -- -- -- 96 %   08/24/19 0838 -- -- -- -- -- 97 %   08/24/19 0837 -- -- -- -- -- 98 %   08/24/19 0836 -- -- -- -- -- 98 %   08/24/19 0833 -- -- -- -- -- 100 %   08/24/19 0832 -- -- -- -- -- 100 %   08/24/19 0830 (!) 157/101 -- -- 98 22 97 %   08/24/19 0826 -- -- -- 98 22 --   08/24/19 0801 -- -- -- 102 22 --   08/24/19 0800 (!) 176/101 100 °F (37.8 °C) CORE 102 23 --   08/24/19 0600 (!) 158/85 -- -- 100 20 98 %   08/24/19 0546 -- -- -- -- 21 98 %   08/24/19 0400 (!) 155/90 100.2 °F (37.9 °C) CORE 95 22 98 %     General Appearance: Sedated on ventilator. Head:  Normocephalic, no trauma  Eyes: Pupils equal, round, reactive to light and accommodation; extraocular movements intact; sclera anicteric; conjunctivae pink. No embolic phenomena. ENT: Oropharynx clear, without erythema, exudate, or thrush. No tenderness of sinuses. Mouth/throat: mucosa pink and moist. No lesions. Dentition in good repair. Neck:Supple, without lymphadenopathy. Thyroid normal, No bruits. Pulmonary/Chest: Clear to auscultation, without wheezes, rales, or rhonchi. No dullness to percussion. Cardiovascular: Regular rate and rhythm without murmurs, rubs, or gallops. Abdomen: Soft, non tender.  Bowel sounds normal. No 8/20/2019 9:50 am       COMPARISON:   08/19/2019       HISTORY:   ORDERING SYSTEM PROVIDED HISTORY: intubated   TECHNOLOGIST PROVIDED HISTORY:   intubated       FINDINGS:   The endotracheal tube measures 3.2 cm from the penelope.  Cardiac silhouette   and mediastinal structures appear stable.  Diffuse increased interstitial and   alveolar opacities are identified bilaterally with bilateral pleural   effusions, with mild worsening from the previous examination.  Osseous   structures appear stable.           Impression   Worsening interstitial and alveolar opacities with bilateral pleural   effusions. EXAMINATION:   ONE XRAY VIEW OF THE CHEST       8/23/2019 6:10 am       COMPARISON:   August 22, 2019, August 21, 2019       HISTORY:   ORDERING SYSTEM PROVIDED HISTORY: intubated, diffuse alveolar hemorrhage   TECHNOLOGIST PROVIDED HISTORY:   intubated, diffuse alveolar hemorrhage       FINDINGS:   The endotracheal tube terminates in appropriate position above the penelope. The enteric tube courses off the field of view in the upper abdomen. Improved lung inflation with better aeration of the upper lobes.  Persistent   ground-glass attenuation and basilar opacities are noted.  The cardiac and   mediastinal contours are without significant change.  No evidence for   pneumothorax or significant effusion.           Impression   Endotracheal and enteric tubes appear unchanged in positions.       Improved lung inflation and aeration, notably of the upper lobes.  Bilateral   airspace disease remains. EXAMINATION:   ONE XRAY VIEW OF THE CHEST       8/24/2019 5:46 am       COMPARISON:   08/23/2019       HISTORY:   ORDERING SYSTEM PROVIDED HISTORY: intubated, diffuse alveolar hemorrhage   TECHNOLOGIST PROVIDED HISTORY:   intubated, diffuse alveolar hemorrhage       FINDINGS:   ETT terminates 3 cm above penelope.  NG tube courses into the abdomen.  Normal   cardiomediastinal silhouette. Johnna Medellin interstitial prominence.

## 2019-08-25 ENCOUNTER — APPOINTMENT (OUTPATIENT)
Dept: GENERAL RADIOLOGY | Age: 34
DRG: 207 | End: 2019-08-25
Payer: COMMERCIAL

## 2019-08-25 LAB
ABSOLUTE EOS #: 0 K/UL (ref 0–0.4)
ABSOLUTE IMMATURE GRANULOCYTE: 0.1 K/UL (ref 0–0.3)
ABSOLUTE LYMPH #: 1.44 K/UL (ref 1–4.8)
ABSOLUTE MONO #: 0.41 K/UL (ref 0.1–0.8)
ABSOLUTE RETIC #: 0.19 M/UL (ref 0.03–0.08)
ALBUMIN SERPL-MCNC: 3.2 G/DL (ref 3.5–5.2)
ALBUMIN/GLOBULIN RATIO: 0.9 (ref 1–2.5)
ALP BLD-CCNC: 87 U/L (ref 35–104)
ALT SERPL-CCNC: 68 U/L (ref 5–33)
ANION GAP SERPL CALCULATED.3IONS-SCNC: 12 MMOL/L (ref 9–17)
AST SERPL-CCNC: 48 U/L
BASOPHILS # BLD: 0 % (ref 0–2)
BASOPHILS ABSOLUTE: 0 K/UL (ref 0–0.2)
BILIRUB SERPL-MCNC: 2.61 MG/DL (ref 0.3–1.2)
BILIRUBIN DIRECT: 0.64 MG/DL
BILIRUBIN, INDIRECT: 1.97 MG/DL (ref 0–1)
BUN BLDV-MCNC: 17 MG/DL (ref 6–20)
BUN/CREAT BLD: ABNORMAL (ref 9–20)
CALCIUM SERPL-MCNC: 9.5 MG/DL (ref 8.6–10.4)
CHLORIDE BLD-SCNC: 100 MMOL/L (ref 98–107)
CO2: 28 MMOL/L (ref 20–31)
CREAT SERPL-MCNC: 0.31 MG/DL (ref 0.5–0.9)
CULTURE: NORMAL
DIFFERENTIAL TYPE: ABNORMAL
EOSINOPHILS RELATIVE PERCENT: 0 % (ref 1–4)
ESTIMATED AVERAGE GLUCOSE: 117 MG/DL
GFR AFRICAN AMERICAN: >60 ML/MIN
GFR NON-AFRICAN AMERICAN: >60 ML/MIN
GFR SERPL CREATININE-BSD FRML MDRD: ABNORMAL ML/MIN/{1.73_M2}
GFR SERPL CREATININE-BSD FRML MDRD: ABNORMAL ML/MIN/{1.73_M2}
GLOBULIN: ABNORMAL G/DL (ref 1.5–3.8)
GLUCOSE BLD-MCNC: 111 MG/DL (ref 65–105)
GLUCOSE BLD-MCNC: 116 MG/DL (ref 65–105)
GLUCOSE BLD-MCNC: 161 MG/DL (ref 65–105)
GLUCOSE BLD-MCNC: 175 MG/DL (ref 70–99)
HBA1C MFR BLD: 5.7 % (ref 4–6)
HCT VFR BLD CALC: 31.2 % (ref 36.3–47.1)
HEMOGLOBIN: 10.3 G/DL (ref 11.9–15.1)
IMMATURE GRANULOCYTES: 1 %
IMMATURE RETIC FRACT: 36.3 % (ref 2.7–18.3)
LACTATE DEHYDROGENASE: 1118 U/L (ref 135–214)
LYMPHOCYTES # BLD: 14 % (ref 24–44)
Lab: NORMAL
MCH RBC QN AUTO: 27.2 PG (ref 25.2–33.5)
MCHC RBC AUTO-ENTMCNC: 33 G/DL (ref 28.4–34.8)
MCV RBC AUTO: 82.3 FL (ref 82.6–102.9)
MONOCYTES # BLD: 4 % (ref 1–7)
MORPHOLOGY: ABNORMAL
NRBC AUTOMATED: 64 PER 100 WBC
NUCLEATED RED BLOOD CELLS: 64 PER 100 WBC
PDW BLD-RTO: 21.2 % (ref 11.8–14.4)
PLATELET # BLD: 204 K/UL (ref 138–453)
PLATELET ESTIMATE: ABNORMAL
PMV BLD AUTO: 10.6 FL (ref 8.1–13.5)
POTASSIUM SERPL-SCNC: 3.8 MMOL/L (ref 3.7–5.3)
RBC # BLD: 3.79 M/UL (ref 3.95–5.11)
RBC # BLD: ABNORMAL 10*6/UL
RETIC %: 5.1 % (ref 0.5–1.9)
RETIC HEMOGLOBIN: 25.2 PG (ref 28.2–35.7)
SEG NEUTROPHILS: 81 % (ref 36–66)
SEGMENTED NEUTROPHILS ABSOLUTE COUNT: 8.35 K/UL (ref 1.8–7.7)
SODIUM BLD-SCNC: 140 MMOL/L (ref 135–144)
SPECIMEN DESCRIPTION: NORMAL
TOTAL PROTEIN: 6.6 G/DL (ref 6.4–8.3)
WBC # BLD: 10.3 K/UL (ref 3.5–11.3)
WBC # BLD: ABNORMAL 10*3/UL

## 2019-08-25 PROCEDURE — 6370000000 HC RX 637 (ALT 250 FOR IP): Performed by: STUDENT IN AN ORGANIZED HEALTH CARE EDUCATION/TRAINING PROGRAM

## 2019-08-25 PROCEDURE — 6360000002 HC RX W HCPCS: Performed by: STUDENT IN AN ORGANIZED HEALTH CARE EDUCATION/TRAINING PROGRAM

## 2019-08-25 PROCEDURE — 2580000003 HC RX 258: Performed by: STUDENT IN AN ORGANIZED HEALTH CARE EDUCATION/TRAINING PROGRAM

## 2019-08-25 PROCEDURE — 99232 SBSQ HOSP IP/OBS MODERATE 35: CPT | Performed by: INTERNAL MEDICINE

## 2019-08-25 PROCEDURE — 36415 COLL VENOUS BLD VENIPUNCTURE: CPT

## 2019-08-25 PROCEDURE — 85045 AUTOMATED RETICULOCYTE COUNT: CPT

## 2019-08-25 PROCEDURE — 99233 SBSQ HOSP IP/OBS HIGH 50: CPT | Performed by: INTERNAL MEDICINE

## 2019-08-25 PROCEDURE — 2000000000 HC ICU R&B

## 2019-08-25 PROCEDURE — 83615 LACTATE (LD) (LDH) ENZYME: CPT

## 2019-08-25 PROCEDURE — 71045 X-RAY EXAM CHEST 1 VIEW: CPT

## 2019-08-25 PROCEDURE — 85025 COMPLETE CBC W/AUTO DIFF WBC: CPT

## 2019-08-25 PROCEDURE — 99291 CRITICAL CARE FIRST HOUR: CPT | Performed by: INTERNAL MEDICINE

## 2019-08-25 PROCEDURE — 82947 ASSAY GLUCOSE BLOOD QUANT: CPT

## 2019-08-25 PROCEDURE — 80048 BASIC METABOLIC PNL TOTAL CA: CPT

## 2019-08-25 PROCEDURE — 80076 HEPATIC FUNCTION PANEL: CPT

## 2019-08-25 RX ADMIN — FAMOTIDINE 20 MG: 20 TABLET, FILM COATED ORAL at 21:03

## 2019-08-25 RX ADMIN — FAMOTIDINE 20 MG: 20 TABLET, FILM COATED ORAL at 12:41

## 2019-08-25 RX ADMIN — LEVOFLOXACIN 750 MG: 5 INJECTION, SOLUTION INTRAVENOUS at 08:23

## 2019-08-25 RX ADMIN — INSULIN LISPRO 2 UNITS: 100 INJECTION, SOLUTION INTRAVENOUS; SUBCUTANEOUS at 05:10

## 2019-08-25 RX ADMIN — SODIUM CHLORIDE, PRESERVATIVE FREE 10 ML: 5 INJECTION INTRAVENOUS at 21:04

## 2019-08-25 RX ADMIN — SODIUM CHLORIDE: 4.5 INJECTION, SOLUTION INTRAVENOUS at 18:48

## 2019-08-25 RX ADMIN — SODIUM CHLORIDE, PRESERVATIVE FREE 10 ML: 5 INJECTION INTRAVENOUS at 08:32

## 2019-08-25 RX ADMIN — AMLODIPINE BESYLATE 10 MG: 10 TABLET ORAL at 08:25

## 2019-08-25 RX ADMIN — METHYLPREDNISOLONE SODIUM SUCCINATE 40 MG: 40 INJECTION, POWDER, FOR SOLUTION INTRAMUSCULAR; INTRAVENOUS at 05:10

## 2019-08-25 RX ADMIN — METHYLPREDNISOLONE SODIUM SUCCINATE 40 MG: 40 INJECTION, POWDER, FOR SOLUTION INTRAMUSCULAR; INTRAVENOUS at 19:03

## 2019-08-25 RX ADMIN — ACETAMINOPHEN 650 MG: 325 TABLET ORAL at 21:05

## 2019-08-25 RX ADMIN — FOLIC ACID 1 MG: 1 TABLET ORAL at 12:41

## 2019-08-25 ASSESSMENT — PAIN SCALES - GENERAL
PAINLEVEL_OUTOF10: 0
PAINLEVEL_OUTOF10: 0
PAINLEVEL_OUTOF10: 4
PAINLEVEL_OUTOF10: 0

## 2019-08-25 NOTE — CARE COORDINATION
Transition planning:  Attempt to meet with pt to discuss transition plan. She is not available at this time, nursing care in progress.  Will need to revisit

## 2019-08-25 NOTE — PROGRESS NOTES
INITIAL EVALUATION:     Pt is a 29year old female with a past history of sickle cell anemia and beta thalasemia who presented to [facility] ED transferred from an outside hospital on 8/18/19 because of generalized chils, pain, malaise and non productive cough. Pt lives in [place] and had been in [place] for [employer] camp this past week. On 8/14/19 she started to experience generalized chills, pain, and weakness in her legs. On 8/18/19 as she left [place] she developed nonproductive cough, increased generalized pain and weakness, and increased malaise. EMS was called and taken to [facility]. Pt was then transferred to [facility] because of tachycardia and tachypenia. Upon arrival to [facility] the patient was placed on nonrebreather mask due to low O2 saturations. She was then switched to BiPAP. Her Hb had dropped to 5.5, she was given 1 unit of PRBC. Of note, Pt is from [place] originally and had told the ICU resident that she was diagnosed with sickle cell and beta thalassemia in [place] and was treated with Folic acid. Her last crisis was more than 10 years ago. Upon examination today, currently transitioned to intubation this afternoon after decreased sats. She was on BiPAP earlier this morning. The patient was very drowsy this morning and unable to give history or follow commands. She was able to weakly hand squeeze BL and move her toes BLE. Nurse noted that she was more awake earlier when she was on the nonrebreather mask. Pt does awake briefly for painful stimuli. Pt has increased heart rate and increased respiratory rate, along with O2 at 100%. ICU concerned for multifocal pneumonia vs ARDS  Currently on Levaquin only. CURRENT EVALUATION: 8/25/2019    Patient evaluated and examined in the ICU. Patient was extubated on 8/24/2019. Patient is still currently on droplet precautions. Patient is weak today with a raspy voice.   Patient is still coughing up old dark blood, but decreased file   Social History Narrative    Not on file       Family History:   No family history on file. Allergies:   Patient has no known allergies. Review of Systems:   Constitutional: Extubated, alert  Head: No headaches  Eyes: No double vision or blurry vision. No conjunctival inflammation. ENT: No sore throat or runny nose. . No hearing loss, tinnitus or vertigo. Cardiovascular: No chest pain or palpitations. No shortness of breath. No GONG  Lung: No shortness of breath or cough. No sputum production  Abdomen: No nausea, vomiting, diarrhea, or abdominal pain. Larraine Piles No cramps. Genitourinary: No increased urinary frequency, or dysuria. No hematuria. No suprapubic or CVA pain  Musculoskeletal: Weakness BUE and BLE  Hematologic: No bleeding or bruising. Neurologic: No headache, weakness, numbness, or tingling. Integument: No rash, no ulcers. Psychiatric: No depression. Endocrine: No polyuria, no polydipsia, no polyphagia.     Physical Examination :     Patient Vitals for the past 8 hrs:   BP Temp Temp src Pulse Resp SpO2 Weight   08/25/19 1023 -- -- -- -- -- 97 % --   08/25/19 1022 -- -- -- -- -- 97 % --   08/25/19 1021 -- -- -- -- -- 97 % --   08/25/19 1020 -- -- -- -- -- 98 % --   08/25/19 1019 -- -- -- -- -- 98 % --   08/25/19 1015 -- -- -- 93 25 98 % --   08/25/19 1000 (!) 159/91 -- -- 72 23 98 % --   08/25/19 0945 -- -- -- 84 22 98 % --   08/25/19 0930 -- -- -- 79 24 100 % --   08/25/19 0917 -- 98.4 °F (36.9 °C) CORE -- -- -- --   08/25/19 0915 -- -- -- 90 20 100 % --   08/25/19 0900 (!) 170/96 -- -- 85 21 100 % --   08/25/19 0845 -- -- -- 73 22 100 % --   08/25/19 0830 -- -- -- 86 22 100 % --   08/25/19 0800 (!) 154/95 -- -- 75 22 99 % --   08/25/19 0745 -- -- -- 77 20 99 % --   08/25/19 0730 -- -- -- 85 27 98 % --   08/25/19 0715 -- -- -- 70 18 99 % --   08/25/19 0700 (!) 167/100 -- -- 85 22 -- --   08/25/19 0600 (!) 168/101 -- -- 73 21 99 % --   08/25/19 0500 (!) 163/99 -- -- 77 19 -- --   08/25/19 0400 08/25/2019    YEAST NOT REPORTED 08/21/2019    TURBIDITY CLEAR 08/21/2019     Lab Results   Component Value Date    CREATININE 0.31 08/25/2019    GLUCOSE 175 08/25/2019       Medical Decision Making-Imaging:     EXAMINATION:   CTA OF THE CHEST 8/18/2019 11:31 pm       TECHNIQUE:   CTA of the chest was performed after the administration of intravenous   contrast.  Multiplanar reformatted images are provided for review.  MIP   images are provided for review. Dose modulation, iterative reconstruction,   and/or weight based adjustment of the mA/kV was utilized to reduce the   radiation dose to as low as reasonably achievable.       COMPARISON:   None.       HISTORY:   ORDERING SYSTEM PROVIDED HISTORY: dyspnea   TECHNOLOGIST PROVIDED HISTORY:   Reason for Exam: dysnpea   Acuity: Acute   Type of Exam: Initial       History of sickle cell anemia.       FINDINGS:   Pulmonary Arteries: Study is motion degraded.  Pulmonary arteries are   adequately opacified for evaluation.  No evidence of intraluminal filling   defect to suggest pulmonary embolism.  Main pulmonary artery is normal in   caliber.       Mediastinum: No evidence of mediastinal lymphadenopathy.  The heart and   pericardium demonstrate no acute abnormality.  There is no acute abnormality   of the thoracic aorta.       Lungs/pleura: There is extensive bilateral symmetric multifocal airspace   disease throughout the lungs.  Fine lung parenchyma is obscured by the   extensive airspace disease and respiratory motion.  There is no pneumothorax   or pleural fluid.       Upper Abdomen: Splenomegaly is suspected.  The spleen was not completely   visualized on this examination.       Soft Tissues/Bones: No acute bone or soft tissue abnormality.           Impression   No gross evidence of a pulmonary embolus on this motion degraded study.       There is extensive multifocal airspace disease throughout the lungs.    Extensive pneumonia is suspected.  Pulmonary edema and ARDS necessary. I agree with the assessment, plan and orders as documented by the resident.     Ari Thomas MD.      Pager: [phone] - Office: [phone]

## 2019-08-25 NOTE — PROGRESS NOTES
lab for CD25 testing. Follow-up reticulocyte count, LDH, hemoglobin platelet count and LFTs tomorrow. On levofloxacin continue  Continue Solu-Medrol. Total critical care time caring for this patient with life threatening, unstable organ failure, including direct patient contact, management of life support systems, review of data including imaging and labs, discussions with other team members and physicians at least 27  Min so far today, excluding procedures. Please note that this chart was generated using voice recognition Dragon dictation software. Although every effort was made to ensure the accuracy of this automated transcription, some errors in transcription may have occurred.     Pedro Coe MD  8/25/2019 5:16 PM

## 2019-08-25 NOTE — PROGRESS NOTES
moist, ETT in place   Neck - supple, no significant adenopathy  Chest - scattered rhonchi   Heart - normal rate, regular rhythm, normal S1, S2,   Abdomen - soft, nontender, nondistended, no masses or organomegaly  Neurological - no deficit. Extremities - peripheral pulses normal, no pedal edema, no clubbing or cyanosis  Skin - pale, no bleeding. DATA:      Labs:       CBC:   Recent Labs     08/23/19  0420 08/24/19  0535   WBC 10.1 10.3   HGB 7.7* 8.0*   HCT 24.2* 25.2*   PLT See Reflexed IPF Result 167     BMP:   Recent Labs     08/23/19  0420 08/24/19  0535    147*   K 4.2 4.1   CO2 25 28   BUN 17 18   CREATININE 0.39* 0.36*   LABGLOM >60 >60   GLUCOSE 243* 285*     PT/INR:   No results for input(s): PROTIME, INR in the last 72 hours. APTT:  No results for input(s): APTT in the last 72 hours. LIVER PROFILE:  Recent Labs     08/23/19 0420 08/24/19  0535   AST 58* 58*   ALT 50* 72*   LABALBU 2.6* 2.9*       Blood smear was viewed, there is significant hypochromasia and anisocytosis and target cells consistent with a thalassemia/sickle cell but there was no schistocytes and no evidence of hemolysis on the smear. IMPRESSION:    Primary Problem  Acute respiratory failure with hypoxia [facility])    Active Hospital Problems    Diagnosis Date Noted    Aplastic crisis ([facility] [D61.89]     Sickle cell crisis ([facility] [D57.00] 08/19/2019    Pneumonia of both lungs due to Mycoplasma pneumoniae [J15.7]     Acute respiratory failure with hypoxia (HCC) [J96.01]     On mechanically assisted ventilation (HCC) [Z99.11]     Pancytopenia (HCC) [D61.818]     ARDS (adult respiratory distress syndrome) (HCC) [J80]     Pneumonia of both lungs due to infectious organism [J18.9]     Thrombocytopenia (HCC) [D69.6]     Diffuse pulmonary alveolar hemorrhage [R04.2]     Massive hemoptysis [R04.2]     Beta thalassemia ([facility] [D56.1] 08/18/2019       RECOMMENDATIONS:  The patient condition continues to improve.   I do

## 2019-08-26 ENCOUNTER — APPOINTMENT (OUTPATIENT)
Dept: GENERAL RADIOLOGY | Age: 34
DRG: 207 | End: 2019-08-26
Payer: COMMERCIAL

## 2019-08-26 LAB
ABSOLUTE EOS #: 0 K/UL (ref 0–0.4)
ABSOLUTE EOS #: 0 K/UL (ref 0–0.4)
ABSOLUTE IMMATURE GRANULOCYTE: 0 K/UL (ref 0–0.3)
ABSOLUTE IMMATURE GRANULOCYTE: 0.11 K/UL (ref 0–0.3)
ABSOLUTE LYMPH #: 0.98 K/UL (ref 1–4.8)
ABSOLUTE LYMPH #: 1.46 K/UL (ref 1–4.8)
ABSOLUTE MONO #: 0.22 K/UL (ref 0.1–0.8)
ABSOLUTE MONO #: 0.65 K/UL (ref 0.1–0.8)
ABSOLUTE RETIC #: 0.26 M/UL (ref 0.03–0.08)
ABSOLUTE RETIC #: 0.31 M/UL (ref 0.03–0.08)
ALBUMIN SERPL-MCNC: 3.2 G/DL (ref 3.5–5.2)
ALBUMIN/GLOBULIN RATIO: 0.9 (ref 1–2.5)
ALP BLD-CCNC: 84 U/L (ref 35–104)
ALT SERPL-CCNC: 115 U/L (ref 5–33)
ANION GAP SERPL CALCULATED.3IONS-SCNC: 12 MMOL/L (ref 9–17)
ANION GAP SERPL CALCULATED.3IONS-SCNC: 19 MMOL/L (ref 9–17)
AST SERPL-CCNC: 92 U/L
BASOPHILS # BLD: 0 % (ref 0–2)
BASOPHILS # BLD: 0 % (ref 0–2)
BASOPHILS ABSOLUTE: 0 K/UL (ref 0–0.2)
BASOPHILS ABSOLUTE: 0 K/UL (ref 0–0.2)
BILIRUB SERPL-MCNC: 3.04 MG/DL (ref 0.3–1.2)
BILIRUBIN DIRECT: 0.69 MG/DL
BILIRUBIN, INDIRECT: 2.35 MG/DL (ref 0–1)
BUN BLDV-MCNC: 17 MG/DL (ref 6–20)
BUN BLDV-MCNC: 17 MG/DL (ref 6–20)
BUN/CREAT BLD: ABNORMAL (ref 9–20)
BUN/CREAT BLD: ABNORMAL (ref 9–20)
CALCIUM SERPL-MCNC: 9.4 MG/DL (ref 8.6–10.4)
CALCIUM SERPL-MCNC: 9.8 MG/DL (ref 8.6–10.4)
CHLORIDE BLD-SCNC: 102 MMOL/L (ref 98–107)
CHLORIDE BLD-SCNC: 102 MMOL/L (ref 98–107)
CO2: 19 MMOL/L (ref 20–31)
CO2: 24 MMOL/L (ref 20–31)
CREAT SERPL-MCNC: 0.26 MG/DL (ref 0.5–0.9)
CREAT SERPL-MCNC: 0.36 MG/DL (ref 0.5–0.9)
CULTURE: NORMAL
CULTURE: NORMAL
D-DIMER QUANTITATIVE: 18.39 MG/L FEU
DIFFERENTIAL TYPE: ABNORMAL
DIFFERENTIAL TYPE: ABNORMAL
EOSINOPHILS RELATIVE PERCENT: 0 % (ref 1–4)
EOSINOPHILS RELATIVE PERCENT: 0 % (ref 1–4)
FDP: >20 UG/ML
FERRITIN: 1260 UG/L (ref 13–150)
GFR AFRICAN AMERICAN: >60 ML/MIN
GFR AFRICAN AMERICAN: >60 ML/MIN
GFR NON-AFRICAN AMERICAN: >60 ML/MIN
GFR NON-AFRICAN AMERICAN: >60 ML/MIN
GFR SERPL CREATININE-BSD FRML MDRD: ABNORMAL ML/MIN/{1.73_M2}
GLOBULIN: ABNORMAL G/DL (ref 1.5–3.8)
GLUCOSE BLD-MCNC: 127 MG/DL (ref 65–105)
GLUCOSE BLD-MCNC: 141 MG/DL (ref 65–105)
GLUCOSE BLD-MCNC: 145 MG/DL (ref 70–99)
GLUCOSE BLD-MCNC: 147 MG/DL (ref 65–105)
GLUCOSE BLD-MCNC: 169 MG/DL (ref 65–105)
GLUCOSE BLD-MCNC: 188 MG/DL (ref 70–99)
GLUCOSE BLD-MCNC: 233 MG/DL (ref 65–105)
HCT VFR BLD CALC: 35.1 % (ref 36.3–47.1)
HCT VFR BLD CALC: 36.7 % (ref 36.3–47.1)
HEMOGLOBIN: 11.3 G/DL (ref 11.9–15.1)
HEMOGLOBIN: 11.9 G/DL (ref 11.9–15.1)
IMMATURE GRANULOCYTES: 0 %
IMMATURE GRANULOCYTES: 1 %
IMMATURE RETIC FRACT: 35.5 % (ref 2.7–18.3)
IMMATURE RETIC FRACT: 39.3 % (ref 2.7–18.3)
LACTATE DEHYDROGENASE: 1162 U/L (ref 135–214)
LYMPHOCYTES # BLD: 20 % (ref 24–44)
LYMPHOCYTES # BLD: 9 % (ref 24–44)
Lab: NORMAL
Lab: NORMAL
MCH RBC QN AUTO: 26.5 PG (ref 25.2–33.5)
MCH RBC QN AUTO: 26.6 PG (ref 25.2–33.5)
MCHC RBC AUTO-ENTMCNC: 32.2 G/DL (ref 28.4–34.8)
MCHC RBC AUTO-ENTMCNC: 32.4 G/DL (ref 28.4–34.8)
MCV RBC AUTO: 82.1 FL (ref 82.6–102.9)
MCV RBC AUTO: 82.4 FL (ref 82.6–102.9)
MONOCYTES # BLD: 3 % (ref 1–7)
MONOCYTES # BLD: 6 % (ref 1–7)
MORPHOLOGY: ABNORMAL
NRBC AUTOMATED: 12.3 PER 100 WBC
NRBC AUTOMATED: 20.7 PER 100 WBC
NUCLEATED RED BLOOD CELLS: 15 PER 100 WBC
NUCLEATED RED BLOOD CELLS: 8 PER 100 WBC
PDW BLD-RTO: 20.5 % (ref 11.8–14.4)
PDW BLD-RTO: 20.6 % (ref 11.8–14.4)
PLATELET # BLD: 226 K/UL (ref 138–453)
PLATELET # BLD: ABNORMAL K/UL (ref 138–453)
PLATELET ESTIMATE: ABNORMAL
PLATELET ESTIMATE: ABNORMAL
PLATELET, FLUORESCENCE: 201 K/UL (ref 138–453)
PLATELET, IMMATURE FRACTION: 4.8 % (ref 1.1–10.3)
PMV BLD AUTO: 11.7 FL (ref 8.1–13.5)
PMV BLD AUTO: ABNORMAL FL (ref 8.1–13.5)
POTASSIUM SERPL-SCNC: 3.7 MMOL/L (ref 3.7–5.3)
POTASSIUM SERPL-SCNC: 4.3 MMOL/L (ref 3.7–5.3)
RBC # BLD: 4.26 M/UL (ref 3.95–5.11)
RBC # BLD: 4.47 M/UL (ref 3.95–5.11)
RBC # BLD: ABNORMAL 10*6/UL
RBC # BLD: ABNORMAL 10*6/UL
RETIC %: 6 % (ref 0.5–1.9)
RETIC %: 6.9 % (ref 0.5–1.9)
RETIC HEMOGLOBIN: 26 PG (ref 28.2–35.7)
RETIC HEMOGLOBIN: 26 PG (ref 28.2–35.7)
SEG NEUTROPHILS: 77 % (ref 36–66)
SEG NEUTROPHILS: 84 % (ref 36–66)
SEGMENTED NEUTROPHILS ABSOLUTE COUNT: 5.62 K/UL (ref 1.8–7.7)
SEGMENTED NEUTROPHILS ABSOLUTE COUNT: 9.16 K/UL (ref 1.8–7.7)
SODIUM BLD-SCNC: 138 MMOL/L (ref 135–144)
SODIUM BLD-SCNC: 140 MMOL/L (ref 135–144)
SPECIMEN DESCRIPTION: NORMAL
SPECIMEN DESCRIPTION: NORMAL
TOTAL PROTEIN: 6.8 G/DL (ref 6.4–8.3)
WBC # BLD: 10.9 K/UL (ref 3.5–11.3)
WBC # BLD: 7.3 K/UL (ref 3.5–11.3)
WBC # BLD: ABNORMAL 10*3/UL
WBC # BLD: ABNORMAL 10*3/UL

## 2019-08-26 PROCEDURE — 99233 SBSQ HOSP IP/OBS HIGH 50: CPT | Performed by: INTERNAL MEDICINE

## 2019-08-26 PROCEDURE — 97110 THERAPEUTIC EXERCISES: CPT

## 2019-08-26 PROCEDURE — 2060000000 HC ICU INTERMEDIATE R&B

## 2019-08-26 PROCEDURE — 85045 AUTOMATED RETICULOCYTE COUNT: CPT

## 2019-08-26 PROCEDURE — 85384 FIBRINOGEN ACTIVITY: CPT

## 2019-08-26 PROCEDURE — 76937 US GUIDE VASCULAR ACCESS: CPT

## 2019-08-26 PROCEDURE — 6370000000 HC RX 637 (ALT 250 FOR IP): Performed by: STUDENT IN AN ORGANIZED HEALTH CARE EDUCATION/TRAINING PROGRAM

## 2019-08-26 PROCEDURE — 6360000002 HC RX W HCPCS: Performed by: STUDENT IN AN ORGANIZED HEALTH CARE EDUCATION/TRAINING PROGRAM

## 2019-08-26 PROCEDURE — 85379 FIBRIN DEGRADATION QUANT: CPT

## 2019-08-26 PROCEDURE — 82728 ASSAY OF FERRITIN: CPT

## 2019-08-26 PROCEDURE — 97535 SELF CARE MNGMENT TRAINING: CPT

## 2019-08-26 PROCEDURE — 88184 FLOWCYTOMETRY/ TC 1 MARKER: CPT

## 2019-08-26 PROCEDURE — 85055 RETICULATED PLATELET ASSAY: CPT

## 2019-08-26 PROCEDURE — 85025 COMPLETE CBC W/AUTO DIFF WBC: CPT

## 2019-08-26 PROCEDURE — 2580000003 HC RX 258: Performed by: STUDENT IN AN ORGANIZED HEALTH CARE EDUCATION/TRAINING PROGRAM

## 2019-08-26 PROCEDURE — 94761 N-INVAS EAR/PLS OXIMETRY MLT: CPT

## 2019-08-26 PROCEDURE — 97116 GAIT TRAINING THERAPY: CPT

## 2019-08-26 PROCEDURE — 36415 COLL VENOUS BLD VENIPUNCTURE: CPT

## 2019-08-26 PROCEDURE — 80048 BASIC METABOLIC PNL TOTAL CA: CPT

## 2019-08-26 PROCEDURE — 85362 FIBRIN DEGRADATION PRODUCTS: CPT

## 2019-08-26 PROCEDURE — 85385 FIBRINOGEN ANTIGEN: CPT

## 2019-08-26 PROCEDURE — 82947 ASSAY GLUCOSE BLOOD QUANT: CPT

## 2019-08-26 PROCEDURE — 97166 OT EVAL MOD COMPLEX 45 MIN: CPT

## 2019-08-26 PROCEDURE — 6370000000 HC RX 637 (ALT 250 FOR IP): Performed by: INTERNAL MEDICINE

## 2019-08-26 PROCEDURE — 80076 HEPATIC FUNCTION PANEL: CPT

## 2019-08-26 PROCEDURE — 99291 CRITICAL CARE FIRST HOUR: CPT | Performed by: INTERNAL MEDICINE

## 2019-08-26 PROCEDURE — 88185 FLOWCYTOMETRY/TC ADD-ON: CPT

## 2019-08-26 PROCEDURE — 83615 LACTATE (LD) (LDH) ENZYME: CPT

## 2019-08-26 RX ORDER — LEVOFLOXACIN 750 MG/1
750 TABLET ORAL DAILY
Status: DISCONTINUED | OUTPATIENT
Start: 2019-08-27 | End: 2019-08-28 | Stop reason: HOSPADM

## 2019-08-26 RX ORDER — METHYLPREDNISOLONE SODIUM SUCCINATE 40 MG/ML
40 INJECTION, POWDER, LYOPHILIZED, FOR SOLUTION INTRAMUSCULAR; INTRAVENOUS DAILY
Status: DISCONTINUED | OUTPATIENT
Start: 2019-08-27 | End: 2019-08-26

## 2019-08-26 RX ADMIN — METHYLPREDNISOLONE SODIUM SUCCINATE 40 MG: 40 INJECTION, POWDER, FOR SOLUTION INTRAMUSCULAR; INTRAVENOUS at 04:42

## 2019-08-26 RX ADMIN — FAMOTIDINE 20 MG: 20 TABLET, FILM COATED ORAL at 09:06

## 2019-08-26 RX ADMIN — Medication 15 ML: at 09:07

## 2019-08-26 RX ADMIN — SODIUM CHLORIDE, PRESERVATIVE FREE 10 ML: 5 INJECTION INTRAVENOUS at 09:07

## 2019-08-26 RX ADMIN — LEVOFLOXACIN 750 MG: 5 INJECTION, SOLUTION INTRAVENOUS at 09:06

## 2019-08-26 RX ADMIN — ACETAMINOPHEN 650 MG: 325 TABLET ORAL at 09:06

## 2019-08-26 RX ADMIN — FOLIC ACID 1 MG: 1 TABLET ORAL at 09:06

## 2019-08-26 RX ADMIN — FAMOTIDINE 20 MG: 20 TABLET, FILM COATED ORAL at 20:57

## 2019-08-26 RX ADMIN — AMLODIPINE BESYLATE 10 MG: 10 TABLET ORAL at 09:07

## 2019-08-26 RX ADMIN — INSULIN LISPRO 2 UNITS: 100 INJECTION, SOLUTION INTRAVENOUS; SUBCUTANEOUS at 04:38

## 2019-08-26 RX ADMIN — SODIUM CHLORIDE, PRESERVATIVE FREE 10 ML: 5 INJECTION INTRAVENOUS at 20:57

## 2019-08-26 RX ADMIN — INSULIN LISPRO 4 UNITS: 100 INJECTION, SOLUTION INTRAVENOUS; SUBCUTANEOUS at 11:49

## 2019-08-26 RX ADMIN — INSULIN LISPRO 1 UNITS: 100 INJECTION, SOLUTION INTRAVENOUS; SUBCUTANEOUS at 21:00

## 2019-08-26 RX ADMIN — INSULIN LISPRO 2 UNITS: 100 INJECTION, SOLUTION INTRAVENOUS; SUBCUTANEOUS at 00:04

## 2019-08-26 ASSESSMENT — PAIN DESCRIPTION - LOCATION
LOCATION: ABDOMEN
LOCATION: ABDOMEN

## 2019-08-26 ASSESSMENT — PAIN SCALES - GENERAL
PAINLEVEL_OUTOF10: 0
PAINLEVEL_OUTOF10: 5
PAINLEVEL_OUTOF10: 3
PAINLEVEL_OUTOF10: 0
PAINLEVEL_OUTOF10: 5

## 2019-08-26 ASSESSMENT — PAIN DESCRIPTION - PAIN TYPE: TYPE: ACUTE PAIN

## 2019-08-26 NOTE — PROGRESS NOTES
Nutrition Assessment    Type and Reason for Visit: Reassess    Nutrition Recommendations: Continue General diet. Encourage PO intake as tolerated. Will continue to follow. Nutrition Assessment: Pt extubated 8/24/19. Started on General diet this morning. Pt reports she is tolerating diet well and that she ate approx 50% of breakfast and lunch today. Pt does not care for the food here. Pt reports good appetite and stable weight PTA. Malnutrition Assessment:  · Malnutrition Status: At risk for malnutrition  · Context: Acute illness or injury  · Findings of the 6 clinical characteristics of malnutrition (Minimum of 2 out of 6 clinical characteristics is required to make the diagnosis of moderate or severe Protein Calorie Malnutrition based on AND/ASPEN Guidelines):  1. Energy Intake-Greater than 75% of estimated energy requirement(with TF while intubated),Greater than or equal to 7 days    2. Weight Loss-1-2% loss(possibly), in 1 week  3. Fat Loss-No significant subcutaneous fat loss,    4. Muscle Loss-No significant muscle mass loss,    5. Fluid Accumulation-Mild fluid accumulation, Extremities  6.  Strength-Not measured    Nutrition Risk Level:  Moderate    Nutrient Needs:  · Estimated Daily Total Kcal: 0846-1178 kcal/day  · Estimated Daily Protein (g): 90 g pro/day     Nutrition Diagnosis:   · Problem: Inadequate oral intake  · Etiology: related to recent diet advancement   Signs and symptoms:  as evidenced by 50% of 2 meals so far    Objective Information:  · Current Nutrition Therapies:  · Oral Diet Orders: General   · Oral Diet intake: 50% of breakfast and lunch today per pt   · Oral Nutrition Supplement (ONS) Orders: None  · Anthropometric Measures:  · Ht: 5' 6\" (167.6 cm)   · Current Body Wt: 177 lb 11.1 oz (80.6 kg)  · Admission Body Wt: 181 lb 7 oz (82.3 kg)  · % Weight Change:? 2% loss in one week (variable weights noted)  · Ideal Body Wt: 130 lb (59 kg), % Ideal Body 136%  · BMI

## 2019-08-26 NOTE — PROGRESS NOTES
EXAM:  Constitutional: Awake, alert and following commands. HEENT: PERRLA, EOMI, sclera clear, anicteric  Respiratory: clear to auscultation, no wheezes or rales and unlabored breathing. Cardiovascular: regular rate and rhythm, normal S1, S2, no murmur noted and 2+ pulses throughout  Abdomen: soft, nontender, nondistended, no masses or organomegaly  NEUROLOGIC : Awake, alert and following commands. Extremities:  peripheral pulses normal, no pedal edema,.       Any additional physical findings:      MEDICATIONS:  Scheduled Meds:   insulin lispro  0-12 Units Subcutaneous TID WC    insulin lispro  0-6 Units Subcutaneous Nightly    amLODIPine  10 mg Oral Daily    methylPREDNISolone  40 mg Intravenous Q12H    benzocaine-menthol  1 lozenge Oral Once    chlorhexidine  15 mL Mouth/Throat BID    folic acid  1 mg Oral Daily    famotidine  20 mg Oral BID    sodium chloride flush  10 mL Intravenous 2 times per day    levofloxacin  750 mg Intravenous Q24H     Continuous Infusions:   dextrose       PRN Meds:     glucose 15 g PRN   dextrose 12.5 g PRN   glucagon (rDNA) 1 mg PRN   dextrose 100 mL/hr PRN   albuterol 2.5 mg Q6H PRN   labetalol 10 mg Q2H PRN   fentanNYL 25 mcg Q1H PRN   Or     fentanNYL 50 mcg Q1H PRN   sodium chloride flush 10 mL PRN   magnesium hydroxide 30 mL Daily PRN   ondansetron 4 mg Q6H PRN   potassium chloride 10 mEq PRN   magnesium sulfate 1 g PRN   acetaminophen 650 mg Q4H PRN   acetaminophen 650 mg Q4H PRN       SUPPORT DEVICES: [] Ventilator [] BIPAP  [x] Nasal Cannula [] Room Air    VENT SETTINGS (Comprehensive) (if applicable):  Vent Information  $Ventilation: $Subsequent Day  Ventilator Started: (S) Yes  Ventilator Stopped: Yes  Skin Assessment: Clean, dry, & intact  Equipment Changed: HME  Vent Type: Servo i  Vent Mode: PRVC  Vt Ordered: 470 mL  Pressure Ordered: 8  Rate Set: 18 bmp  FiO2 : 60 %  Sensitivity: 4  PEEP/CPAP: 8  I Time/ I Time %: 0.8 s  Humidification Source: HME  Additional Respiratory  Assessments  Pulse: 117  Resp: 27  SpO2: 97 %  End Tidal CO2: 43 (%)  Position: Semi-Barron's  Humidification Source: HME  Oral Care Completed?: Yes  Oral Care: Teeth brushed, Mouthwash  Subglottic Suction Done?: Yes    ABGs:     Lab Results   Component Value Date    RCQ9QXQ 34 08/24/2019    FIO2 30.0 08/24/2019     Lactic Acid:   Lab Results   Component Value Date    LACTA NOT REPORTED 08/18/2019         DATA:  Complete Blood Count:   Recent Labs     08/24/19  0535 08/25/19  0517 08/26/19  0435   WBC 10.3 10.3 7.3   HGB 8.0* 10.3* 11.3*   MCV 84.0 82.3* 82.4*    204 See Reflexed IPF Result   RBC 3.00* 3.79* 4.26   HCT 25.2* 31.2* 35.1*   MCH 26.7 27.2 26.5   MCHC 31.7 33.0 32.2   RDW 20.5* 21.2* 20.6*   MPV 11.4 10.6 NOT REPORTED        PT/INR:    Lab Results   Component Value Date    PROTIME 10.8 08/20/2019    INR 1.0 08/20/2019     PTT:    Lab Results   Component Value Date    APTT 27.4 08/20/2019       Basal Metabolic Profile:   Recent Labs     08/24/19 0535 08/25/19 0517 08/26/19 0435   * 140 138   K 4.1 3.8 4.3   BUN 18 17 17   CREATININE 0.36* 0.31* 0.26*    100 102   CO2 28 28 24      Magnesium:   Lab Results   Component Value Date    MG 1.8 08/20/2019    MG 1.9 08/18/2019     Phosphorus: No results found for: PHOS  S. Calcium:  Recent Labs     08/26/19 0435   CALCIUM 9.4     S. Ionized Calcium:No results for input(s): IONCA in the last 72 hours.       Urinalysis:   Lab Results   Component Value Date    NITRU POSITIVE 08/21/2019    COLORU DARK YELLOW 08/21/2019    PHUR 6.0 08/21/2019    WBCUA 0 TO 2 08/21/2019    RBCUA 2 TO 5 08/21/2019    MUCUS NOT REPORTED 08/21/2019    TRICHOMONAS NOT REPORTED 08/21/2019    YEAST NOT REPORTED 08/21/2019    BACTERIA NOT REPORTED 08/21/2019    SPECGRAV 1.019 08/21/2019    LEUKOCYTESUR NEGATIVE 08/21/2019    UROBILINOGEN Normal 08/21/2019    BILIRUBINUR MOD 08/21/2019    GLUCOSEU NEGATIVE 08/21/2019    KETUA NEGATIVE 08/21/2019    AMORPHOUS

## 2019-08-26 NOTE — PROGRESS NOTES
rails  Entrance Stairs - Number of Steps: 3  Entrance Stairs - Rails: Right  Bathroom Shower/Tub: Tub/Shower unit  Bathroom Toilet: Standard  Home Equipment: (none)  ADL Assistance: Independent  Homemaking Assistance: Independent  Homemaking Responsibilities: Yes  Ambulation Assistance: Independent  Transfer Assistance: Independent  Active : No  Mode of Transportation: Cab  Occupation: Full time employment  Type of occupation:   Leisure & Hobbies: Islam, shopping  Additional Comments: Spouse is self-employed    Objective   Vision: Within Functional Limits  Hearing: Within functional limits    Orientation  Overall Orientation Status: Within Functional Limits     Balance  Sitting Balance: Modified independent   Standing Balance: Supervision  Standing Balance  Time: 10 min  Activity: Pt stood chairside, sinkside, at toilet, func mob around room  Functional Mobility  Functional - Mobility Device: Rolling Walker  Activity: To/from bathroom  Assist Level: Stand by assistance  Functional Mobility Comments: Slow pace, 1 major LOB noted, RW improved balance, pt able to pick object from floor-had difficulty standing back up  Toilet Transfers  Toilet - Technique: Ambulating  Equipment Used: Grab bars  Toilet Transfer: Supervision  ADL  Feeding: Modified independent ; Increased time to complete  Grooming: Supervision; Increased time to complete(Oral care standing sinkside)  UE Bathing: Supervision  LE Bathing: Stand by assistance  UE Dressing: Stand by assistance  LE Dressing: Contact guard assistance  Toileting: Stand by assistance(Urinated while sitting on toilet, performed own dinora-bottom care)  Tone RUE  RUE Tone: Normotonic  Tone LUE  LUE Tone: Normotonic  Coordination  Movements Are Fluid And Coordinated: No  Coordination and Movement description: Decreased speed     Bed mobility  Supine to Sit: Unable to assess  Sit to Supine: Unable to assess  Scooting: Stand by assistance  Comment: Pt seated in recliner

## 2019-08-26 NOTE — PROGRESS NOTES
violence:     Fear of current or ex partner: Not on file     Emotionally abused: Not on file     Physically abused: Not on file     Forced sexual activity: Not on file   Other Topics Concern    Not on file   Social History Narrative    Not on file       Family History:   No family history on file. Allergies:   Patient has no known allergies. Review of Systems:   Constitutional: Extubated, alert  Head: No headaches  Eyes: No double vision or blurry vision. No conjunctival inflammation. ENT: No sore throat or runny nose. . No hearing loss, tinnitus or vertigo. Cardiovascular: No chest pain or palpitations. No shortness of breath. No GONG  Lung: No shortness of breath or cough. No sputum production  Abdomen: No nausea, vomiting, diarrhea, or abdominal pain. Kristen Nya No cramps. Genitourinary: No increased urinary frequency, or dysuria. No hematuria. No suprapubic or CVA pain  Musculoskeletal: Weakness BLE  Hematologic: No bleeding or bruising. Neurologic: No headache, weakness, numbness, or tingling. Integument: No rash, no ulcers. Psychiatric: No depression. Endocrine: No polyuria, no polydipsia, no polyphagia. Physical Examination :     Patient Vitals for the past 8 hrs:   BP Temp Temp src Pulse Resp SpO2 Weight   08/26/19 1000 (!) 135/100 -- -- 95 25 -- --   08/26/19 0900 (!) 150/129 -- -- 85 24 98 % --   08/26/19 0800 (!) 153/103 99.7 °F (37.6 °C) Axillary 66 19 -- --   08/26/19 0700 (!) 152/99 -- -- 67 19 99 % --   08/26/19 0600 (!) 161/109 -- -- 87 21 100 % --   08/26/19 0530 -- -- -- -- -- -- 177 lb 11.1 oz (80.6 kg)   08/26/19 0500 (!) 175/108 -- -- 84 26 100 % --   08/26/19 0400 (!) 144/95 98.4 °F (36.9 °C) Oral 88 22 99 % --     General Appearance: Extubated, alert. Head:  Normocephalic, no trauma  Eyes: Pupils equal, round, reactive to light and accommodation; extraocular movements intact; sclera anicteric; conjunctivae pink. No embolic phenomena.   ENT: Oropharynx clear, without erythema, exudate, pm       TECHNIQUE:   CTA of the chest was performed after the administration of intravenous   contrast.  Multiplanar reformatted images are provided for review.  MIP   images are provided for review. Dose modulation, iterative reconstruction,   and/or weight based adjustment of the mA/kV was utilized to reduce the   radiation dose to as low as reasonably achievable.       COMPARISON:   None.       HISTORY:   ORDERING SYSTEM PROVIDED HISTORY: dyspnea   TECHNOLOGIST PROVIDED HISTORY:   Reason for Exam: dysnpea   Acuity: Acute   Type of Exam: Initial       History of sickle cell anemia.       FINDINGS:   Pulmonary Arteries: Study is motion degraded.  Pulmonary arteries are   adequately opacified for evaluation.  No evidence of intraluminal filling   defect to suggest pulmonary embolism.  Main pulmonary artery is normal in   caliber.       Mediastinum: No evidence of mediastinal lymphadenopathy.  The heart and   pericardium demonstrate no acute abnormality.  There is no acute abnormality   of the thoracic aorta.       Lungs/pleura: There is extensive bilateral symmetric multifocal airspace   disease throughout the lungs.  Fine lung parenchyma is obscured by the   extensive airspace disease and respiratory motion.  There is no pneumothorax   or pleural fluid.       Upper Abdomen: Splenomegaly is suspected.  The spleen was not completely   visualized on this examination.       Soft Tissues/Bones: No acute bone or soft tissue abnormality.           Impression   No gross evidence of a pulmonary embolus on this motion degraded study.       There is extensive multifocal airspace disease throughout the lungs.    Extensive pneumonia is suspected.  Pulmonary edema and ARDS not excluded.         EXAMINATION:   ONE XRAY VIEW OF THE CHEST       8/19/2019 3:01 am       COMPARISON:   CT chest done 08/18/2019.       HISTORY:   ORDERING SYSTEM PROVIDED HISTORY: SOB   TECHNOLOGIST PROVIDED HISTORY:   SOB   Reason for Exam: Short of [facility]   Med Rec: [id number]   Path Number: [id number]   Collected: 8/19/2019   Received: 8/19/2019   Reported: 8/20/2019 15:27     -- Diagnosis --   RIGHT LUNG, MIDDLE AND LOWER LOBES, BRONCHOALVEOLAR LAVAGE:   - BLOODY SAMPLE, CONSISTENT WITH INTRA-ALVEOLAR HEMORRHAGE.   - NEGATIVE FOR VIRAL INCLUSIONS, YEAST/FUNGI, PNEUMOCYSTIS,   SIDEROPHAGES    AND MALIGNANCY.        David Licona,   **Electronically Signed Out**         tb1/8/20/2019         Clinical Information   Pre-op Diagnosis:  BLEEDING AFTER INTUBATION   Operative Findings:  RIGHT MIDDLE LOBE, RIGHT LOWER LOBE BAL, STAT   IMMUNOCOMPROMISED   Operation Performed:  BRONCHOSCOPY     Source of Specimen   1: RIGHT MIDDLE LOBE, RIGHT LOWER LOBE BAL        Medical Decision Making-Other:     Note:  · Labs, medications, radiologic studies were reviewed with personal review of films  · Moderate Large amounts of data were reviewed  · Discussed with nursing Staff, Discharge planner  · Infection Control and Prevention measures reviewed  · All prior entries were reviewed  · Administer medications as ordered  · Prognosis: Good  · Discharge planning reviewed  · Follow up as outpatient. Thank you for allowing us to participate in the care of this patient. Please call with questions. Jo Ann Lee PGY1    ATTESTATION:    I have discussed the case, including pertinent history and exam findings with the residents. I have seen and examined the patient and the key elements of the encounter have been performed by me. I have reviewed the laboratory data, other diagnostic studies and discussed them with the residents. I have updated the medical record where necessary. I agree with the assessment, plan and orders as documented by the resident.     Arianna Maier MD.

## 2019-08-26 NOTE — PLAN OF CARE
Oral health is maintained or improved  Outcome: Ongoing  Goal: ET tube will be managed safely  Outcome: Ongoing  Goal: Ability to express needs and understand communication  Outcome: Ongoing  Goal: Mobility/activity is maintained at optimum level for patient  Outcome: Ongoing     Problem: SKIN INTEGRITY  Goal: Skin integrity is maintained or improved  Outcome: Ongoing     Problem: Falls - Risk of:  Goal: Will remain free from falls  Description  Will remain free from falls  8/25/2019 2218 by Blair Henning RN  Outcome: Ongoing  8/25/2019 0929 by Aurelio Virgen RN  Outcome: Ongoing  Goal: Absence of physical injury  Description  Absence of physical injury  Outcome: Ongoing     Problem: Risk for Impaired Skin Integrity  Goal: Tissue integrity - skin and mucous membranes  Description  Structural intactness and normal physiological function of skin and  mucous membranes.   8/25/2019 2218 by Blair Henning RN  Outcome: Ongoing  8/25/2019 0929 by Aurelio Virgen RN  Outcome: Ongoing     Problem: Musculor/Skeletal Functional Status  Goal: Highest potential functional level  Outcome: Ongoing

## 2019-08-27 PROBLEM — R09.89 LABILE BLOOD PRESSURE: Status: ACTIVE | Noted: 2019-08-27

## 2019-08-27 PROBLEM — E87.6 HYPOKALEMIA: Status: ACTIVE | Noted: 2019-08-27

## 2019-08-27 PROBLEM — R73.9 HYPERGLYCEMIA: Status: ACTIVE | Noted: 2019-08-27

## 2019-08-27 PROBLEM — I10 ESSENTIAL HYPERTENSION: Status: ACTIVE | Noted: 2019-08-27

## 2019-08-27 PROBLEM — R74.01 TRANSAMINASEMIA: Status: ACTIVE | Noted: 2019-08-27

## 2019-08-27 PROBLEM — R76.8 ANA POSITIVE: Status: ACTIVE | Noted: 2019-08-27

## 2019-08-27 LAB
ABSOLUTE EOS #: 0.06 K/UL (ref 0–0.4)
ABSOLUTE IMMATURE GRANULOCYTE: 0 K/UL (ref 0–0.3)
ABSOLUTE LYMPH #: 2.32 K/UL (ref 1–4.8)
ABSOLUTE MONO #: 0.29 K/UL (ref 0.1–0.8)
ABSOLUTE RETIC #: 0.26 M/UL (ref 0.03–0.08)
ALBUMIN SERPL-MCNC: 2.9 G/DL (ref 3.5–5.2)
ALBUMIN/GLOBULIN RATIO: 1 (ref 1–2.5)
ALP BLD-CCNC: 68 U/L (ref 35–104)
ALT SERPL-CCNC: 85 U/L (ref 5–33)
ANION GAP SERPL CALCULATED.3IONS-SCNC: 10 MMOL/L (ref 9–17)
AST SERPL-CCNC: 43 U/L
BASOPHILS # BLD: 0 % (ref 0–2)
BASOPHILS ABSOLUTE: 0 K/UL (ref 0–0.2)
BILIRUB SERPL-MCNC: 2.15 MG/DL (ref 0.3–1.2)
BILIRUBIN DIRECT: 0.47 MG/DL
BILIRUBIN, INDIRECT: 1.68 MG/DL (ref 0–1)
BUN BLDV-MCNC: 13 MG/DL (ref 6–20)
BUN/CREAT BLD: ABNORMAL (ref 9–20)
CALCIUM SERPL-MCNC: 8.8 MG/DL (ref 8.6–10.4)
CHLORIDE BLD-SCNC: 105 MMOL/L (ref 98–107)
CO2: 24 MMOL/L (ref 20–31)
CREAT SERPL-MCNC: 0.35 MG/DL (ref 0.5–0.9)
DIFFERENTIAL TYPE: ABNORMAL
EOSINOPHILS RELATIVE PERCENT: 1 % (ref 1–4)
GFR AFRICAN AMERICAN: >60 ML/MIN
GFR NON-AFRICAN AMERICAN: >60 ML/MIN
GFR SERPL CREATININE-BSD FRML MDRD: ABNORMAL ML/MIN/{1.73_M2}
GFR SERPL CREATININE-BSD FRML MDRD: ABNORMAL ML/MIN/{1.73_M2}
GLOBULIN: ABNORMAL G/DL (ref 1.5–3.8)
GLUCOSE BLD-MCNC: 102 MG/DL (ref 65–105)
GLUCOSE BLD-MCNC: 106 MG/DL (ref 70–99)
GLUCOSE BLD-MCNC: 116 MG/DL (ref 65–105)
GLUCOSE BLD-MCNC: 177 MG/DL (ref 65–105)
GLUCOSE BLD-MCNC: 98 MG/DL (ref 65–105)
HCT VFR BLD CALC: 30.8 % (ref 36.3–47.1)
HEMOGLOBIN: 10.1 G/DL (ref 11.9–15.1)
IMMATURE GRANULOCYTES: 0 %
IMMATURE RETIC FRACT: 30 % (ref 2.7–18.3)
LACTATE DEHYDROGENASE: 723 U/L (ref 135–214)
LYMPHOCYTES # BLD: 40 % (ref 24–44)
MAGNESIUM: 2.2 MG/DL (ref 1.6–2.6)
MCH RBC QN AUTO: 27.4 PG (ref 25.2–33.5)
MCHC RBC AUTO-ENTMCNC: 32.8 G/DL (ref 28.4–34.8)
MCV RBC AUTO: 83.7 FL (ref 82.6–102.9)
MONOCYTES # BLD: 5 % (ref 1–7)
MORPHOLOGY: ABNORMAL
NRBC AUTOMATED: 3.8 PER 100 WBC
NUCLEATED RED BLOOD CELLS: 4 PER 100 WBC
PDW BLD-RTO: 20 % (ref 11.8–14.4)
PLATELET # BLD: 196 K/UL (ref 138–453)
PLATELET ESTIMATE: ABNORMAL
PMV BLD AUTO: 10.9 FL (ref 8.1–13.5)
POTASSIUM SERPL-SCNC: 3.2 MMOL/L (ref 3.7–5.3)
RBC # BLD: 3.68 M/UL (ref 3.95–5.11)
RBC # BLD: ABNORMAL 10*6/UL
RETIC %: 8 % (ref 0.5–1.9)
RETIC HEMOGLOBIN: 28 PG (ref 28.2–35.7)
SEG NEUTROPHILS: 54 % (ref 36–66)
SEGMENTED NEUTROPHILS ABSOLUTE COUNT: 3.13 K/UL (ref 1.8–7.7)
SODIUM BLD-SCNC: 139 MMOL/L (ref 135–144)
SURGICAL PATHOLOGY REPORT: NORMAL
TOTAL PROTEIN: 5.9 G/DL (ref 6.4–8.3)
WBC # BLD: 5.8 K/UL (ref 3.5–11.3)
WBC # BLD: ABNORMAL 10*3/UL

## 2019-08-27 PROCEDURE — 6370000000 HC RX 637 (ALT 250 FOR IP): Performed by: STUDENT IN AN ORGANIZED HEALTH CARE EDUCATION/TRAINING PROGRAM

## 2019-08-27 PROCEDURE — 99232 SBSQ HOSP IP/OBS MODERATE 35: CPT | Performed by: INTERNAL MEDICINE

## 2019-08-27 PROCEDURE — 99223 1ST HOSP IP/OBS HIGH 75: CPT | Performed by: INTERNAL MEDICINE

## 2019-08-27 PROCEDURE — 83735 ASSAY OF MAGNESIUM: CPT

## 2019-08-27 PROCEDURE — 6370000000 HC RX 637 (ALT 250 FOR IP): Performed by: INTERNAL MEDICINE

## 2019-08-27 PROCEDURE — 2060000000 HC ICU INTERMEDIATE R&B

## 2019-08-27 PROCEDURE — 83615 LACTATE (LD) (LDH) ENZYME: CPT

## 2019-08-27 PROCEDURE — 2580000003 HC RX 258: Performed by: STUDENT IN AN ORGANIZED HEALTH CARE EDUCATION/TRAINING PROGRAM

## 2019-08-27 PROCEDURE — 36415 COLL VENOUS BLD VENIPUNCTURE: CPT

## 2019-08-27 PROCEDURE — 97164 PT RE-EVAL EST PLAN CARE: CPT

## 2019-08-27 PROCEDURE — 97162 PT EVAL MOD COMPLEX 30 MIN: CPT

## 2019-08-27 PROCEDURE — 97530 THERAPEUTIC ACTIVITIES: CPT

## 2019-08-27 PROCEDURE — 85045 AUTOMATED RETICULOCYTE COUNT: CPT

## 2019-08-27 PROCEDURE — 80076 HEPATIC FUNCTION PANEL: CPT

## 2019-08-27 PROCEDURE — 82947 ASSAY GLUCOSE BLOOD QUANT: CPT

## 2019-08-27 PROCEDURE — 80048 BASIC METABOLIC PNL TOTAL CA: CPT

## 2019-08-27 PROCEDURE — 85025 COMPLETE CBC W/AUTO DIFF WBC: CPT

## 2019-08-27 RX ORDER — LEVOFLOXACIN 750 MG/1
750 TABLET ORAL DAILY
Qty: 10 TABLET | Refills: 0 | Status: SHIPPED | OUTPATIENT
Start: 2019-08-28 | End: 2019-09-07

## 2019-08-27 RX ORDER — POTASSIUM CHLORIDE 20 MEQ/1
40 TABLET, EXTENDED RELEASE ORAL PRN
Status: DISCONTINUED | OUTPATIENT
Start: 2019-08-27 | End: 2019-08-28 | Stop reason: HOSPADM

## 2019-08-27 RX ORDER — POTASSIUM CHLORIDE 7.45 MG/ML
10 INJECTION INTRAVENOUS PRN
Status: DISCONTINUED | OUTPATIENT
Start: 2019-08-27 | End: 2019-08-28 | Stop reason: HOSPADM

## 2019-08-27 RX ORDER — ALBUTEROL SULFATE 90 UG/1
2 AEROSOL, METERED RESPIRATORY (INHALATION) EVERY 6 HOURS PRN
Qty: 1 INHALER | Refills: 3 | Status: SHIPPED | OUTPATIENT
Start: 2019-08-27

## 2019-08-27 RX ORDER — AMLODIPINE BESYLATE 10 MG/1
10 TABLET ORAL DAILY
Qty: 30 TABLET | Refills: 3 | Status: SHIPPED | OUTPATIENT
Start: 2019-08-28

## 2019-08-27 RX ORDER — FOLIC ACID 1 MG/1
1 TABLET ORAL DAILY
Qty: 30 TABLET | Refills: 3 | Status: SHIPPED | OUTPATIENT
Start: 2019-08-28

## 2019-08-27 RX ADMIN — SODIUM CHLORIDE, PRESERVATIVE FREE 10 ML: 5 INJECTION INTRAVENOUS at 20:36

## 2019-08-27 RX ADMIN — POTASSIUM CHLORIDE 40 MEQ: 1500 TABLET, EXTENDED RELEASE ORAL at 11:42

## 2019-08-27 RX ADMIN — Medication 15 ML: at 09:24

## 2019-08-27 RX ADMIN — AMLODIPINE BESYLATE 10 MG: 10 TABLET ORAL at 09:19

## 2019-08-27 RX ADMIN — FOLIC ACID 1 MG: 1 TABLET ORAL at 09:19

## 2019-08-27 RX ADMIN — FAMOTIDINE 20 MG: 20 TABLET, FILM COATED ORAL at 20:35

## 2019-08-27 RX ADMIN — FAMOTIDINE 20 MG: 20 TABLET, FILM COATED ORAL at 09:19

## 2019-08-27 RX ADMIN — LEVOFLOXACIN 750 MG: 750 TABLET, FILM COATED ORAL at 09:19

## 2019-08-27 RX ADMIN — INSULIN LISPRO 1 UNITS: 100 INJECTION, SOLUTION INTRAVENOUS; SUBCUTANEOUS at 20:36

## 2019-08-27 RX ADMIN — SODIUM CHLORIDE, PRESERVATIVE FREE 10 ML: 5 INJECTION INTRAVENOUS at 09:30

## 2019-08-27 RX ADMIN — Medication 15 ML: at 20:35

## 2019-08-27 ASSESSMENT — ENCOUNTER SYMPTOMS
NAUSEA: 0
COUGH: 1
PHOTOPHOBIA: 0
WHEEZING: 0
ABDOMINAL DISTENTION: 0
SHORTNESS OF BREATH: 1
VOMITING: 0
BLOOD IN STOOL: 0

## 2019-08-27 ASSESSMENT — PAIN SCALES - GENERAL
PAINLEVEL_OUTOF10: 0
PAINLEVEL_OUTOF10: 0

## 2019-08-27 NOTE — H&P
[address]        HISTORY AND PHYSICAL EXAMINATION            Date:   8/27/2019  Patient name:  Minh Haque  Date of admission:  8/18/2019 10:32 PM  MRN:   [id number]  Account:  [de-identified]  YOB: 1985  PCP:    No primary care provider on file. Room:   [address] [place] [address]  Code Status:    Full Code    Chief Complaint:     Chief Complaint   Patient presents with    Fatigue     Tx from [facility]. Not feeling well since Thursday.  Shortness of Breath     on 4L O2       History Obtained From:     patient, electronic medical record    History of Present Illness: The patient is a 29 y.o. female who is admitted to the hospital for the management of:  Mycoplasma pneumonia  Sickle cell anemia    The patient has been seen for the assumption of care from the critical care/pulmonary service    As documented in the medical record: \"This is a 28-year-old female with past medical history of sickle cell anemia, hypertension who was transferred from an outside hospital because of complaints of generalized chills pain malaise and nonproductive cough. She was on a [employer] camp in [place] when she started experiencing the symptoms.   On presentation here, initially patient was on nonrebreather mask but then required intubation. Patient underwent bronchoscopy which showed diffuse alveolar hemorrhage. There was concern for ARDS versus severe mycoplasma pneumonia versus HLH syndrome. His lab work showed picture of hemolytic anemia, patient's hemoglobin did drop and required 2 units of packed RBC however it has remained stable throughout ICU course since then. Parvovirus B19 was tested which was negative, she had positive IgM mycoplasma antibody. Currently getting treatment with IV Levaquin. Rheumatology was also consulted because of positive MIKE and anti-double-stranded DNA however there was low concern for lupus.   Hemoglobin light-headedness. Psychiatric/Behavioral: Positive for sleep disturbance (Sleeping better). Physical Exam:   /76   Pulse 90   Temp 98.2 °F (36.8 °C) (Oral)   Resp 20   Ht 5' 6\" (1.676 m)   Wt 173 lb 8 oz (78.7 kg)   SpO2 98%   BMI 28.00 kg/m²    Temp (24hrs), Av.5 °F (36.9 °C), Min:98.2 °F (36.8 °C), Max:99.2 °F (37.3 °C)    Recent Labs     19  1143 19  1715 195 19  0758   POCGLU 233* 127* 141* 102       Intake/Output Summary (Last 24 hours) at 2019 1104  Last data filed at 2019  Gross per 24 hour   Intake 1893 ml   Output 850 ml   Net 1043 ml       Physical Exam   Constitutional: She is oriented to person, place, and time. No distress. HENT:   Head: Normocephalic. Nose: Nose normal.   Eyes: Conjunctivae are normal. No scleral icterus. Neck: Neck supple. No tracheal deviation present. Cardiovascular: Normal rate and regular rhythm. Pulmonary/Chest: Effort normal and breath sounds normal. No respiratory distress. She has no wheezes. She has no rales. She exhibits no tenderness. Abdominal: Soft. Bowel sounds are normal. She exhibits no distension. There is no tenderness. Musculoskeletal: She exhibits no edema or tenderness. Neurological: She is alert and oriented to person, place, and time. Skin: Skin is warm and dry. She is not diaphoretic. Psychiatric: Her behavior is normal. Thought content normal.   The patient is anxious for discharge and return home   Vitals reviewed.       Investigations:      Laboratory Testing:  Recent Results (from the past 24 hour(s))   POC Glucose Fingerstick    Collection Time: 19 11:43 AM   Result Value Ref Range    POC Glucose 233 (H) 65 - 105 mg/dL   FERRITIN    Collection Time: 19  1:46 PM   Result Value Ref Range    Ferritin 1,260 (H) 13 - 150 ug/L   FIBRIN SPLIT PRODUCTS    Collection Time: 19  1:46 PM   Result Value Ref Range    FDP >20 (H) <5 ug/mL   D-DIMER, QUANTITATIVE Collection Time: 08/26/19  1:46 PM   Result Value Ref Range    D-Dimer, Quant 18.39 mg/L FEU   Basic Metabolic Panel w/ Reflex to MG    Collection Time: 08/26/19  1:46 PM   Result Value Ref Range    Glucose 188 (H) 70 - 99 mg/dL    BUN 17 6 - 20 mg/dL    CREATININE 0.36 (L) 0.50 - 0.90 mg/dL    Bun/Cre Ratio NOT REPORTED 9 - 20    Calcium 9.8 8.6 - 10.4 mg/dL    Sodium 140 135 - 144 mmol/L    Potassium 3.7 3.7 - 5.3 mmol/L    Chloride 102 98 - 107 mmol/L    CO2 19 (L) 20 - 31 mmol/L    Anion Gap 19 (H) 9 - 17 mmol/L    GFR Non-African American >60 >60 mL/min    GFR African American >60 >60 mL/min    GFR Comment          GFR Staging NOT REPORTED    CBC Auto Differential    Collection Time: 08/26/19  1:46 PM   Result Value Ref Range    WBC 10.9 3.5 - 11.3 k/uL    RBC 4.47 3.95 - 5.11 m/uL    Hemoglobin 11.9 11.9 - 15.1 g/dL    Hematocrit 36.7 36.3 - 47.1 %    MCV 82.1 (L) 82.6 - 102.9 fL    MCH 26.6 25.2 - 33.5 pg    MCHC 32.4 28.4 - 34.8 g/dL    RDW 20.5 (H) 11.8 - 14.4 %    Platelets 083 051 - 188 k/uL    MPV 11.7 8.1 - 13.5 fL    NRBC Automated 12.3 (H) 0.0 per 100 WBC    Differential Type NOT REPORTED     WBC Morphology NOT REPORTED     RBC Morphology NOT REPORTED     Platelet Estimate NOT REPORTED     Immature Granulocytes 1 (H) 0 %    Seg Neutrophils 84 (H) 36 - 66 %    Lymphocytes 9 (L) 24 - 44 %    Monocytes 6 1 - 7 %    Eosinophils % 0 (L) 1 - 4 %    Basophils 0 0 - 2 %    nRBC 8 (H) 0 per 100 WBC    Absolute Immature Granulocyte 0.11 0.00 - 0.30 k/uL    Segs Absolute 9.16 (H) 1.8 - 7.7 k/uL    Absolute Lymph # 0.98 (L) 1.0 - 4.8 k/uL    Absolute Mono # 0.65 0.1 - 0.8 k/uL    Absolute Eos # 0.00 0.0 - 0.4 k/uL    Basophils Absolute 0.00 0.0 - 0.2 k/uL    Morphology ANISOCYTOSIS PRESENT     Morphology MICROCYTOSIS PRESENT     Morphology 1+ POLYCHROMASIA     Morphology 1+ TARGET CELLS     Morphology 1+ SCHISTOCYTES    Reticulocytes    Collection Time: 08/26/19  1:46 PM   Result Value Ref Range    Retic % 6.9 primary care provider on file. Notify PCP of discharge     Consultations:   IP CONSULT TO HEM/ONC  IP CONSULT TO CRITICAL CARE  IP CONSULT TO INFECTIOUS DISEASES  IP CONSULT TO RHEUMATOLOGY  IP CONSULT TO IV TEAM        Ashley Officer,   8/27/2019  11:04 AM    Copy sent to Dr. Renetta Mi primary care provider on file.

## 2019-08-27 NOTE — PROGRESS NOTES
Transportation: Cab  Occupation: Full time employment  Type of occupation:   Leisure & Hobbies: Confucianist, shopping  Additional Comments: Spouse is self-employed  Cognition   Cognition  Overall Cognitive Status: WFL    Objective  Observation/Palpation  Posture: Good    Joint Mobility  ROM RLE: WFL  ROM LLE: WFL  ROM RUE: WFL  ROM LUE: WFL  Strength RLE  Strength RLE: WFL  Strength LLE  Strength LLE: Exception  L Hip Flexion: 4/5  Strength RUE  Strength RUE: WFL  Strength LUE  Strength LUE: Exception  L Shoulder Flexion: 4-/5  L Elbow Flexion: 4/5  Tone RLE  RLE Tone: Normotonic  Tone LLE  LLE Tone: Normotonic  Motor Control  Gross Motor?: WFL  Sensation  Overall Sensation Status: WFL(Pt with no reports of numbness/tingling at time of evaluation)  Bed mobility  Supine to Sit: Independent  Sit to Supine: Independent  Scooting: Independent  Comment: Pt completed supine <> sit transfer and scooting EOB independently without use of bed rails. Transfers  Sit to Stand: Contact guard assistance  Stand to sit: Contact guard assistance  Comment: Pt completed STS transfer x2 without AD CGA. Ambulation  Ambulation?: Yes  More Ambulation?: Yes  Ambulation 1  Surface: level tile  Device: Rolling Walker  Assistance: Contact guard assistance  Quality of Gait: Slow nicole   Gait Deviations: Slow Nicole;Decreased step length;Decreased step height  Distance: 20 ft   Comments: Pt ambulated 20 ft x1 with RW CGA. Pt demonstrated slow nicole with decreased step height and length requiring verbal cues 25% of the time for obstacle negotiation. Ambulation 2  Surface - 2: level tile  Device 2: No device  Assistance 2: Contact guard assistance  Quality of Gait 2: Slow nicole; Unsteady  Gait Deviations: Slow Nicole;Decreased step length;Decreased step height  Distance: 20 ft x1  Comments: Pt ambulated 20 ft without AD CGA. Pt demonstrated unsteady gait with slow nicole and decreased step height and length.

## 2019-08-27 NOTE — PROGRESS NOTES
bedside this morning. Afebrile, no other acute events to report. Over all, pt improved. Laying in bed comfortably with good mood and mental status. She has some cough with brown mucus. She has been having diarrhea. No complaints of hemoptysis, musculoskeletal pain, nausea, vomiting, constipation, fever, chills, dyspnea, chest pain, fatigue. Primary team has consulted rheumatology rule out SLE. This patient is improving clinically. More details about patient history. Pt lives in [place]. She was in her usual health before the trip. Then patient had a trip to [place] for [employer] camp. She states that she could not tolerate the could weather in [place]. She did not have proper clothing and blankets. She felt extremely cold at night while camping. This was when patient started to have pain crisis. Sickle cell disease was stable. She did not experience any pain crisis in past 10 years until this camping trip. Patient denies she has history of SLE, recent sick contacts. (adults and children), joint pain, muscle pain, TB exposure, last PPD was done 1 year ago which was negative. Family Hx: mom has sickle cell disease and HTN. anut has breast cancer mets. Father has DM and unspecified heart condition. Social Hx: no smoking, no drinking alcohol, no drug abuse. Currently living with her  who has 3 children of his own. Sex Hx: sexually active with . No condoms use. No contraception. No STD history. 1 healthy child. T-max: 99.7  VSS     CXR 8-25-19: Cardiomegaly and central vascular congestion. Bilateral lower lobe airspace opacities favor combination of pleural effusion and atelectasis/airspace disease. Interval endotracheal and enteric extubation. Viral PCR panel negative, Parvovirus negative.   Cold agglutinins <1.32    Labs, X rays reviewed: 8/27/2019    BUN:10-->7-->18--> 17  Cr:0.55-->0.38--> 0.31-->0.26    WBC:8.3-->9.1-->14.3-->12.2--> 10.3--> effusions. EXAMINATION:   ONE XRAY VIEW OF THE CHEST       8/23/2019 6:10 am       COMPARISON:   August 22, 2019, August 21, 2019       HISTORY:   ORDERING SYSTEM PROVIDED HISTORY: intubated, diffuse alveolar hemorrhage   TECHNOLOGIST PROVIDED HISTORY:   intubated, diffuse alveolar hemorrhage       FINDINGS:   The endotracheal tube terminates in appropriate position above the penleope. The enteric tube courses off the field of view in the upper abdomen. Improved lung inflation with better aeration of the upper lobes.  Persistent   ground-glass attenuation and basilar opacities are noted.  The cardiac and   mediastinal contours are without significant change.  No evidence for   pneumothorax or significant effusion.           Impression   Endotracheal and enteric tubes appear unchanged in positions.       Improved lung inflation and aeration, notably of the upper lobes.  Bilateral   airspace disease remains. EXAMINATION:   ONE XRAY VIEW OF THE CHEST       8/24/2019 5:46 am       COMPARISON:   08/23/2019       HISTORY:   ORDERING SYSTEM PROVIDED HISTORY: intubated, diffuse alveolar hemorrhage   TECHNOLOGIST PROVIDED HISTORY:   intubated, diffuse alveolar hemorrhage       FINDINGS:   ETT terminates 3 cm above penelope.  NG tube courses into the abdomen.  Normal   cardiomediastinal silhouette. Vicci Riley interstitial prominence.  Hazy   opacification of the lungs in the mid to lower lung zones right greater than   left probably combination of airspace disease and layering effusion.  Not   significantly changed.  Bones grossly intact.  Left           Impression   Stable chest showing vascular prominence and hazy opacification of the mid   and lower lungs right greater than left possibly combination of airspace   disease and effusion.          Medical Decision [id number]:     NASOPHARYNGEAL SWAB         Special Requests NOT REPORTED      Culture NEGATIVE: HSV-1 DNA not detected by nucleic acid amplification.       NEGATIVE: HSV-2 DNA not detected by nucleic acid amplification.       Due to the specimen source, HSV 1,2 testing was performed by a molecular method.       NEGATIVE for Influenza A and B, Para influenza 1,2,3, Adenovirus and RSV. at day 1       Negative results do not preclude respiratory virus infection and should not be used as the sole basis for diagnosis, treatment or other management decisions.       NEGATIVE for Cytomegalovirus at day 1       8/20/2019  3:28 PM - Raul, M[facility] Incoming Lab Results From [facility]     Component Collected Lab   Surgical Pathology Report 08/19/2019  3:20  [address],  [address]. [place], [address]   [phone]   Fax: [phone]     [facility]       Patient Name: Criselda Gottron: [id number]   Path Number: [id number]   Collected: 8/19/2019   Received: 8/19/2019   Reported: 8/20/2019 15:27     -- Diagnosis --   RIGHT LUNG, MIDDLE AND LOWER LOBES, BRONCHOALVEOLAR LAVAGE:   - BLOODY SAMPLE, CONSISTENT WITH INTRA-ALVEOLAR HEMORRHAGE.   - NEGATIVE FOR VIRAL INCLUSIONS, YEAST/FUNGI, PNEUMOCYSTIS,   SIDEROPHAGES    AND MALIGNANCY.        David Jack,   **Electronically Signed Out**         tb1/8/20/2019         Clinical Information   Pre-op Diagnosis:  BLEEDING AFTER INTUBATION   Operative Findings:  RIGHT MIDDLE LOBE, RIGHT LOWER LOBE BAL, STAT   IMMUNOCOMPROMISED   Operation Performed:  BRONCHOSCOPY     Source of Specimen   1: RIGHT MIDDLE LOBE, RIGHT LOWER LOBE BAL        Medical Decision Making-Other:     Note:  · Labs, medications, radiologic studies were reviewed with personal review of films  · Moderate Large amounts of data were reviewed  · Discussed with nursing Staff, Discharge planner  · Infection Control and Prevention measures reviewed  · All prior entries were reviewed  · Administer

## 2019-08-28 ENCOUNTER — APPOINTMENT (OUTPATIENT)
Dept: GENERAL RADIOLOGY | Age: 34
DRG: 207 | End: 2019-08-28
Payer: COMMERCIAL

## 2019-08-28 VITALS
HEART RATE: 76 BPM | TEMPERATURE: 97.9 F | HEIGHT: 66 IN | OXYGEN SATURATION: 98 % | RESPIRATION RATE: 16 BRPM | DIASTOLIC BLOOD PRESSURE: 83 MMHG | WEIGHT: 172.62 LBS | SYSTOLIC BLOOD PRESSURE: 133 MMHG | BODY MASS INDEX: 27.74 KG/M2

## 2019-08-28 LAB
ABSOLUTE EOS #: 0.05 K/UL (ref 0–0.4)
ABSOLUTE IMMATURE GRANULOCYTE: 0 K/UL (ref 0–0.3)
ABSOLUTE LYMPH #: 1.8 K/UL (ref 1–4.8)
ABSOLUTE MONO #: 0.3 K/UL (ref 0.1–0.8)
ABSOLUTE RETIC #: 0.25 M/UL (ref 0.03–0.08)
ALBUMIN SERPL-MCNC: 3.2 G/DL (ref 3.5–5.2)
ALBUMIN/GLOBULIN RATIO: 1.2 (ref 1–2.5)
ALP BLD-CCNC: 67 U/L (ref 35–104)
ALT SERPL-CCNC: 71 U/L (ref 5–33)
ANION GAP SERPL CALCULATED.3IONS-SCNC: 13 MMOL/L (ref 9–17)
AST SERPL-CCNC: 35 U/L
BASOPHILS # BLD: 0 % (ref 0–2)
BASOPHILS ABSOLUTE: 0 K/UL (ref 0–0.2)
BILIRUB SERPL-MCNC: 1.74 MG/DL (ref 0.3–1.2)
BILIRUBIN DIRECT: 0.36 MG/DL
BILIRUBIN, INDIRECT: 1.38 MG/DL (ref 0–1)
BUN BLDV-MCNC: 9 MG/DL (ref 6–20)
BUN/CREAT BLD: ABNORMAL (ref 9–20)
CALCIUM SERPL-MCNC: 9 MG/DL (ref 8.6–10.4)
CHLORIDE BLD-SCNC: 105 MMOL/L (ref 98–107)
CO2: 23 MMOL/L (ref 20–31)
CREAT SERPL-MCNC: 0.37 MG/DL (ref 0.5–0.9)
DIFFERENTIAL TYPE: ABNORMAL
EOSINOPHILS RELATIVE PERCENT: 1 % (ref 1–4)
FLOW CYTOMETRY BL: NORMAL
GFR AFRICAN AMERICAN: >60 ML/MIN
GFR NON-AFRICAN AMERICAN: >60 ML/MIN
GFR SERPL CREATININE-BSD FRML MDRD: ABNORMAL ML/MIN/{1.73_M2}
GFR SERPL CREATININE-BSD FRML MDRD: ABNORMAL ML/MIN/{1.73_M2}
GLOBULIN: ABNORMAL G/DL (ref 1.5–3.8)
GLUCOSE BLD-MCNC: 105 MG/DL (ref 70–99)
HCT VFR BLD CALC: 31.5 % (ref 36.3–47.1)
HEMOGLOBIN: 10.1 G/DL (ref 11.9–15.1)
IMMATURE GRANULOCYTES: 0 %
IMMATURE RETIC FRACT: 29.2 % (ref 2.7–18.3)
INTERVENTION: NORMAL
LACTATE DEHYDROGENASE: 620 U/L (ref 135–214)
LYMPHOCYTES # BLD: 36 % (ref 24–44)
MCH RBC QN AUTO: 26.6 PG (ref 25.2–33.5)
MCHC RBC AUTO-ENTMCNC: 32.1 G/DL (ref 28.4–34.8)
MCV RBC AUTO: 83.1 FL (ref 82.6–102.9)
MONOCYTES # BLD: 6 % (ref 1–7)
MORPHOLOGY: ABNORMAL
NRBC AUTOMATED: 1.4 PER 100 WBC
NUCLEATED RED BLOOD CELLS: 2 PER 100 WBC
PDW BLD-RTO: 19.7 % (ref 11.8–14.4)
PLATELET # BLD: 220 K/UL (ref 138–453)
PLATELET ESTIMATE: ABNORMAL
PMV BLD AUTO: 11.8 FL (ref 8.1–13.5)
POTASSIUM SERPL-SCNC: 3.8 MMOL/L (ref 3.7–5.3)
RBC # BLD: 3.79 M/UL (ref 3.95–5.11)
RBC # BLD: ABNORMAL 10*6/UL
RETIC %: 6.7 % (ref 0.5–1.9)
RETIC HEMOGLOBIN: 26.2 PG (ref 28.2–35.7)
SEG NEUTROPHILS: 57 % (ref 36–66)
SEGMENTED NEUTROPHILS ABSOLUTE COUNT: 2.85 K/UL (ref 1.8–7.7)
SODIUM BLD-SCNC: 141 MMOL/L (ref 135–144)
TOTAL PROTEIN: 5.9 G/DL (ref 6.4–8.3)
WBC # BLD: 5 K/UL (ref 3.5–11.3)
WBC # BLD: ABNORMAL 10*3/UL

## 2019-08-28 PROCEDURE — 6370000000 HC RX 637 (ALT 250 FOR IP): Performed by: INTERNAL MEDICINE

## 2019-08-28 PROCEDURE — 85045 AUTOMATED RETICULOCYTE COUNT: CPT

## 2019-08-28 PROCEDURE — 99232 SBSQ HOSP IP/OBS MODERATE 35: CPT | Performed by: INTERNAL MEDICINE

## 2019-08-28 PROCEDURE — 36415 COLL VENOUS BLD VENIPUNCTURE: CPT

## 2019-08-28 PROCEDURE — 86738 MYCOPLASMA ANTIBODY: CPT

## 2019-08-28 PROCEDURE — 2580000003 HC RX 258: Performed by: STUDENT IN AN ORGANIZED HEALTH CARE EDUCATION/TRAINING PROGRAM

## 2019-08-28 PROCEDURE — 6370000000 HC RX 637 (ALT 250 FOR IP): Performed by: STUDENT IN AN ORGANIZED HEALTH CARE EDUCATION/TRAINING PROGRAM

## 2019-08-28 PROCEDURE — 71046 X-RAY EXAM CHEST 2 VIEWS: CPT

## 2019-08-28 PROCEDURE — 80076 HEPATIC FUNCTION PANEL: CPT

## 2019-08-28 PROCEDURE — 85025 COMPLETE CBC W/AUTO DIFF WBC: CPT

## 2019-08-28 PROCEDURE — 83615 LACTATE (LD) (LDH) ENZYME: CPT

## 2019-08-28 PROCEDURE — 80048 BASIC METABOLIC PNL TOTAL CA: CPT

## 2019-08-28 PROCEDURE — 99238 HOSP IP/OBS DSCHRG MGMT 30/<: CPT | Performed by: INTERNAL MEDICINE

## 2019-08-28 RX ADMIN — SODIUM CHLORIDE, PRESERVATIVE FREE 10 ML: 5 INJECTION INTRAVENOUS at 08:23

## 2019-08-28 RX ADMIN — AMLODIPINE BESYLATE 10 MG: 10 TABLET ORAL at 08:22

## 2019-08-28 RX ADMIN — FAMOTIDINE 20 MG: 20 TABLET, FILM COATED ORAL at 08:22

## 2019-08-28 RX ADMIN — FOLIC ACID 1 MG: 1 TABLET ORAL at 08:22

## 2019-08-28 RX ADMIN — LEVOFLOXACIN 750 MG: 750 TABLET, FILM COATED ORAL at 08:21

## 2019-08-28 ASSESSMENT — ENCOUNTER SYMPTOMS
VOMITING: 0
SHORTNESS OF BREATH: 1
ABDOMINAL PAIN: 0
COUGH: 0
NAUSEA: 0

## 2019-08-28 NOTE — PROGRESS NOTES
[address]    Progress Note    8/28/2019    11:16 AM    Name:   Eduard Mail  MRN:     [id number]     Melodie:      [de-identified]   Room:   46/1[facility]  IP Day:  5  Admit Date:  8/18/2019 10:32 PM    PCP:   No primary care provider on file. Code Status:  Full Code    Subjective:     C/C:   Chief Complaint   Patient presents with    Fatigue     Tx from [facility]. Not feeling well since Thursday.  Shortness of Breath     on 4L O2     Interval History Status: The patient continues to improve  Ambulating about the room  Less dyspnea on exertion  Feels good  \"Ready to go home \"    Data Base Updates: Follow-up chest x-ray:  Impression:   No acute intrathoracic process. WBC 5.0 k/uL RBC 3.79Low m/uL Hemoglobin 10.1Low     LD 620High   Retic % 6.7High   NRBC Automated 1. 4High     Alb 3.2Low g/dL Alkaline Phosphatase 67 U/L ALT 71High U/L AST 35High U/L Total Bilirubin 1. 74High       Brief History:     The patient is a 29 y.o. female who is admitted to the hospital for the management of:  Mycoplasma pneumonia  Sickle cell anemia     The patient has been seen for the assumption of care from the critical care/pulmonary service     As documented in the medical record:   \"This is a 73-year-old female with past medical history of sickle cell anemia, hypertension who was transferred from an outside hospital because of complaints of generalized chills pain malaise and nonproductive cough.  She was on a [employer] camp in [place] when she started experiencing the symptoms.   On presentation here, initially patient was on nonrebreather mask but then required intubation.  Patient underwent bronchoscopy which showed diffuse alveolar hemorrhage.  There was concern for ARDS versus severe mycoplasma pneumonia versus HLH syndrome.  His lab work showed picture of hemolytic anemia, patient's hemoglobin did drop and required 2 units of packed RBC however it hospitalist service  Hematology was consulted for her sickle cell anemia  Blood products were administered as needed  Antibiotics were managed by ID  Rheumatology was consulted for positive MIKE  Electrolyte abnormalities were addressed  BP has been somewhat labile  She did have hyperglycemia     The patient responded well to therapy  DC planning was initiated  The patient was instructed to follow up with their PCP,  in one week    Medications: Allergies:  No Known Allergies    Current Meds:   Scheduled Meds:    insulin lispro  0-12 Units Subcutaneous TID WC    insulin lispro  0-6 Units Subcutaneous Nightly    levofloxacin  750 mg Oral Daily    amLODIPine  10 mg Oral Daily    benzocaine-menthol  1 lozenge Oral Once    chlorhexidine  15 mL Mouth/Throat BID    folic acid  1 mg Oral Daily    famotidine  20 mg Oral BID    sodium chloride flush  10 mL Intravenous 2 times per day     Continuous Infusions:    dextrose       PRN Meds: potassium chloride **OR** potassium alternative oral replacement **OR** potassium chloride, glucose, dextrose, glucagon (rDNA), dextrose, albuterol, labetalol, fentanNYL **OR** fentanNYL, sodium chloride flush, magnesium hydroxide, ondansetron, magnesium sulfate, acetaminophen, acetaminophen    Data:     Past Medical History:   has a past medical history of Sickle cell beta thalassemia ([facility] [address] Social History:        Family History: History reviewed. No pertinent family history. Vitals:  /83   Pulse 76   Temp 97.9 °F (36.6 °C) (Oral)   Resp 16   Ht 5' 6\" (1.676 m)   Wt 172 lb 9.9 oz (78.3 kg)   SpO2 98%   BMI 27.86 kg/m²   Temp (24hrs), Av.6 °F (37 °C), Min:97.9 °F (36.6 °C), Max:99.5 °F (37.5 °C)    Recent Labs     19  0758 19  1206 19  1623 199   POCGLU 102 98 116* 177*       I/O (24Hr):   No intake or output data in the 24 hours ending 19 1116      Review of Systems:     Review of Systems   Constitutional: Positive for

## 2019-08-28 NOTE — DISCHARGE SUMMARY
because of complaints of generalized chills pain malaise and nonproductive cough.  She was on a [employer] camp in [place] when she started experiencing the symptoms.   On presentation here, initially patient was on nonrebreather mask but then required intubation. Puma Gordon underwent bronchoscopy which showed diffuse alveolar hemorrhage.  There was concern for ARDS versus severe mycoplasma pneumonia versus HLH syndrome.  His lab work showed picture of hemolytic anemia, patient's hemoglobin did drop and required 2 units of packed RBC however it has remained stable throughout ICU course since then.  Parvovirus B19 was tested which was negative, she had positive IgM mycoplasma antibody.  Currently getting treatment with IV Levaquin.    Rheumatology was also consulted because of positive MIKE and anti-double-stranded DNA however there was low concern for lupus.  Hemoglobin electrophoresis which showed 49.9% HBS and also beta thalassemia.   Heme-onc has been following the patient, patient is getting every day LDH and reticulocyte count as well as LFTs which have been worsening.  No indication for intervention as of now with heme-onc.  Concern for aplastic crisis and sickle cell crisis. \"      Patient did require intubation and ventilatory support  She did have bronchoscopy  Bronchial lavage revealed:  Specimen Description . BRONCHIAL ALVEOLAR LAVAGE . RIGHT LOWER LOBE Special Requests NOT REPORTED Direct Exam NO ACID FAST BACILLI SEEN (CONCENTRATED SMEAR) Culture NO GROWTH 6 DAYS      Database has included:  Glucose 233High   Results for Charity Sagastume (MRN [id number]) as of 8/27/2019 11:00    Ref. Range 8/24/2019 05:35   Hemoglobin A1C Latest Ref Range: 4.0 - 6.0 % 5.7      FDP >20High   Ferritin 1,260High   Retic % 8.0High   LD 723High      Total Bilirubin 2. 15High   ALT 85High U/L   AST 43High      Alb 2.9Low      D-Dimer, Quant 18.39   CTA revealed:  No evidence of PE  Extensive multifocal pneumonia, concerns of pulmonary HEM/ONC  IP CONSULT TO CRITICAL CARE  IP CONSULT TO INFECTIOUS DISEASES  IP CONSULT TO RHEUMATOLOGY  IP CONSULT TO IV TEAM        Discharged Condition:    Stable     Disposition: Home    Physician Follow Up:   PCP              In 1 week  For follow up       Diet:   Low sodium     Activity:   As tolerated    Discharge Medications:      Medication List      START taking these medications    albuterol sulfate  (90 Base) MCG/ACT inhaler  Inhale 2 puffs into the lungs every 6 hours as needed for Wheezing     amLODIPine 10 MG tablet  Commonly known as:  NORVASC  Take 1 tablet by mouth daily     folic acid 1 MG tablet  Commonly known as:  FOLVITE  Take 1 tablet by mouth daily     levofloxacin 750 MG tablet  Commonly known as:  LEVAQUIN  Take 1 tablet by mouth daily for 10 days        CONTINUE taking these medications    VICODIN 5-300 MG Tabs  Generic drug:  HYDROcodone-acetaminophen           Where to Get Your Medications      These medications were sent to [facility] [address] [facility], [address], [address] [facility] [facility] [address]    Phone:  [phone]   · albuterol sulfate  (90 Base) MCG/ACT inhaler  · amLODIPine 10 MG tablet  · folic acid 1 MG tablet  · levofloxacin 750 MG tablet         Time Spent on discharge is  20 mins in patient examination, evaluation, counseling, medication reconciliation, discharge plan and follow up.     Electronically signed by   Manuel Lloyd DO  8/28/2019  6:37 PM

## 2019-08-28 NOTE — PROGRESS NOTES
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 She is much better today. Normal breathing. No active bleeding. No hemorrhage. Labs are suggesting less hemolysis. Overall much better. Obviously patient's improvement is likely related to treatment of mycoplasma. ID following. She will be on treatment until September 4. Monitor labs. Possible discharge today. Patient's questions were answered to the best of her satisfaction and she verbalized full understanding and agreement.           Shareen Sandifer, MD

## 2019-08-28 NOTE — PROGRESS NOTES
TECHNOLOGIST PROVIDED HISTORY:   intubated, diffuse alveolar hemorrhage       FINDINGS:   The endotracheal tube terminates in appropriate position above the penelope. The enteric tube courses off the field of view in the upper abdomen. Improved lung inflation with better aeration of the upper lobes.  Persistent   ground-glass attenuation and basilar opacities are noted.  The cardiac and   mediastinal contours are without significant change.  No evidence for   pneumothorax or significant effusion.           Impression   Endotracheal and enteric tubes appear unchanged in positions.       Improved lung inflation and aeration, notably of the upper lobes.  Bilateral   airspace disease remains. EXAMINATION:   ONE XRAY VIEW OF THE CHEST       8/24/2019 5:46 am       COMPARISON:   08/23/2019       HISTORY:   ORDERING SYSTEM PROVIDED HISTORY: intubated, diffuse alveolar hemorrhage   TECHNOLOGIST PROVIDED HISTORY:   intubated, diffuse alveolar hemorrhage       FINDINGS:   ETT terminates 3 cm above penelope.  NG tube courses into the abdomen.  Normal   cardiomediastinal silhouette. Bo Bane interstitial prominence.  Hazy   opacification of the lungs in the mid to lower lung zones right greater than   left probably combination of airspace disease and layering effusion.  Not   significantly changed.  Bones grossly intact.  Left           Impression   Stable chest showing vascular prominence and hazy opacification of the mid   and lower lungs right greater than left possibly combination of airspace   disease and effusion.          Medical Decision [id number]:     NASOPHARYNGEAL SWAB         Special Requests NOT REPORTED      Culture NEGATIVE: HSV-1 DNA not detected by nucleic acid amplification.       NEGATIVE: HSV-2 DNA not detected by nucleic acid amplification.       Due to the specimen source, HSV 1,2 testing was performed by a molecular method.       NEGATIVE for Influenza A and B, Para influenza 1,2,3,

## 2019-08-28 NOTE — PROGRESS NOTES
Pulmonary Progress Note    CC:  resp failure  Pulm. Edema  ARDS  Subjective:  Clinically improving  No resp distress  Awake and alert  Afebrile  No DVt  Radiology studies reviewed  Will get repeat xray chest .  Gas exchange is good    Review of Systems -reviewed and unchanged  General ROS: negative for - chills, fatigue, fever or weight loss  ENT ROS: negative for - headaches, oral lesions or sore throat  Cardiovascular ROS: no chest pain , orthopnea or pnd   Gastrointestinal ROS: no abdominal pain, change in bowel habits, or black or bloody stools  Skin - no rash   Neuro - no blurry vision , no loc . No focal weakness   msk - no jt tenderness or swelling    Vascular - no claudication , rest completed and negative   Lymphatic - complete and negative   Hematology - oncology - complete and negative   Allergy immunology - complete and negative    no burning or hematuria         LUNG CANCER SCREENING     1. CRITERIA MET    []     CT ORDERED  []      2. CRITERIA NOT MET   [x]      3. REFUSED                    []        REASON CRITERIA NOT MET     1. SMOKING LESS THAN 30 PY  []      2. AGE LESS THAN 55 or GREATER 77 YEARS  [x]      3. QUIT SMOKING 15 YEARS OR GREATER   []      4. RECENT CT WITH IN 11 MONTHS    []      5. LIFE EXPECTANCY < 5 YEARS   []      6. SIGNS  AND SYMPTOMS OF LUNG CANCER   []           Immunization     There is no immunization history on file for this patient. Pneumococcal Vaccine     [] Up to date    [x] Indicated   [] Refused  [] Contraindicated       Influenza Vaccine   [] Up to date    [x] Indicated   [] Refused  [] Contraindicated     PAST MEDICAL HISTORY:       Diagnosis Date    Sickle cell beta thalassemia ([facility] [address]          Family History:   History reviewed. No pertinent family history.     SURGICAL HISTORY:   Past Surgical History:   Procedure Laterality Date    BRONCHOSCOPY  8/19/2019    BRONCHOSCOPY ALVEOLAR LAVAGE performed by Pedro Coe MD at [facility]              TOBACCO: Normal                 :    Neck:   Supple, symmetrical, trachea midline, no adenopathy;     thyroid:  no enlargement/tenderness/nodules; no carotid    bruit  JVP not elevated ,  HJR is negative   Back:     Symmetric, no curvature, ROM normal, no CVA tenderness   Lungs:       Clear to auscultation bilaterally, respirations unlabored no dullness no bb , no wheezing , vent all lobes , diaphram motion normal ,fine basal bilateral rales   Chest Wall:    No tenderness or deformity      Heart:    Regular rate and rhythm, S1 and S2 normal, no murmur, rub        or gallop no rvh                           Abdomen:                                                 Pulses:                                            Lymph nodes:                    Neurologic:                  Soft, non-tender, bowel sounds active all four quadrants,     no masses, no organomegaly         2+ and symmetric all extremities            Cervical, supraclavicular not enlarged or matted or tender      CNII-XII intact, normal strength 5/5 .   Sensation grossly normal  and reflexes normal 2+  throughout     Clubbing / NO:  Lower ext edemaNO:     Musculoskeletal - no joint swelling or tenderness or synovitis            Medications:    Scheduled Meds:   insulin lispro  0-12 Units Subcutaneous TID WC    insulin lispro  0-6 Units Subcutaneous Nightly    levofloxacin  750 mg Oral Daily    amLODIPine  10 mg Oral Daily    benzocaine-menthol  1 lozenge Oral Once    chlorhexidine  15 mL Mouth/Throat BID    folic acid  1 mg Oral Daily    famotidine  20 mg Oral BID    sodium chloride flush  10 mL Intravenous 2 times per day       Continuous Infusions:   dextrose         PRN Meds:  potassium chloride **OR** potassium alternative oral replacement **OR** potassium chloride, glucose, dextrose, glucagon (rDNA), dextrose, albuterol, labetalol, fentanNYL **OR** fentanNYL, sodium chloride flush, magnesium hydroxide, ondansetron, magnesium sulfate, acetaminophen,

## 2019-08-28 NOTE — PROGRESS NOTES
resident. I have reviewed the key elements of all parts of the encounter with the resident. I have seen and examined the patient with the resident. I agree with the assessment and plan and status of the problem list as documented. I seen the patient during my round this morning, I have reviewed the labs, previous culture results of bronchial washing cultures, chest x-ray is reviewed and events noted. She is afebrile, she is doing much better she is ambulating to bathroom without getting shortness of breath, she denies cough wheezing and she does not have any hemoptysis for last 2 days. Her LDH has reduced to 620 bilirubin is down to 1.74 AST ALT is mildly elevated and is stable, WBC count is 5 hemoglobin had been stable at 10 and platelet count recovered to 220. She has mild bibasilar crackles present especially at right base more than the left base there is no expiratory wheezing. She is maintaining saturation on room air 96%. She is continued on Levaquin for 7 more days per infectious disease and she was seen by hematology. I have repeated a chest x-ray this morning and chest x-ray shows almost complete resolution of infiltrate pleural effusion and airspace disease. He is okay to discharge from pulmonary standpoint. I have advised her to follow-up with hematology and primary care physician in [place].       Ceferino Mendoza MD  8/28/2019 6:47 PM

## 2019-08-30 LAB
FIBRINOGEN ANTIGEN: 634 MG/DL (ref 149–353)
FIBRINOGEN RATIO: 1.25 RATIO (ref 0.59–1.23)
FIBRINOGEN: 508 MG/DL (ref 150–430)
MYCOPLASMA PNEUMONIAE IGG: 3.74
MYCOPLASMA PNEUMONIAE IGM: 0.96

## 2019-09-16 LAB
CULTURE: NORMAL
Lab: NORMAL
SPECIMEN DESCRIPTION: NORMAL

## 2019-10-07 LAB
CULTURE: NORMAL
DIRECT EXAM: NORMAL
Lab: NORMAL
SPECIMEN DESCRIPTION: NORMAL

## (undated) DEVICE — ADAPTER TBNG DIA15MM SWVL FBROPT BRONCHSCP TERM 2 AXIS PEEP